# Patient Record
Sex: FEMALE | Race: BLACK OR AFRICAN AMERICAN | Employment: STUDENT | URBAN - METROPOLITAN AREA
[De-identification: names, ages, dates, MRNs, and addresses within clinical notes are randomized per-mention and may not be internally consistent; named-entity substitution may affect disease eponyms.]

---

## 2017-01-19 ENCOUNTER — HOSPITAL ENCOUNTER (EMERGENCY)
Age: 22
Discharge: HOME | End: 2017-01-19
Attending: EMERGENCY MEDICINE
Payer: COMMERCIAL

## 2017-01-19 VITALS
RESPIRATION RATE: 16 BRPM | DIASTOLIC BLOOD PRESSURE: 82 MMHG | WEIGHT: 281 LBS | OXYGEN SATURATION: 99 % | HEIGHT: 67 IN | BODY MASS INDEX: 44.1 KG/M2 | SYSTOLIC BLOOD PRESSURE: 128 MMHG | TEMPERATURE: 97 F | HEART RATE: 99 BPM

## 2017-01-19 DIAGNOSIS — B96.89 GARDNERELLA VAGINITIS: Primary | ICD-10-CM

## 2017-01-19 DIAGNOSIS — N76.0 GARDNERELLA VAGINITIS: Primary | ICD-10-CM

## 2017-01-19 LAB
BILIRUB UR QL STRIP.AUTO: NEGATIVE
CLARITY UR REFRACT.AUTO: CLEAR
COLOR UR AUTO: YELLOW
GLUCOSE UR STRIP.AUTO-MCNC: NEGATIVE MG/DL
KETONES UR STRIP.AUTO-MCNC: NEGATIVE MG/DL
NITRITE UR QL STRIP.AUTO: NEGATIVE
PH UR STRIP.AUTO: 6.5 [PH] (ref 4.5–8)
POCT LOT NUMBER: NORMAL
POCT URINE PREGNANCY: NEGATIVE
PROT UR STRIP.AUTO-MCNC: NEGATIVE MG/DL
RBC UR QL AUTO: NEGATIVE
SP GR UR STRIP.AUTO: 1.02 (ref 1–1.03)
UROBILINOGEN UR STRIP.AUTO-MCNC: 0.2 MG/DL

## 2017-01-19 PROCEDURE — 87491 CHLMYD TRACH DNA AMP PROBE: CPT | Performed by: EMERGENCY MEDICINE

## 2017-01-19 PROCEDURE — 87660 TRICHOMONAS VAGIN DIR PROBE: CPT | Performed by: EMERGENCY MEDICINE

## 2017-01-19 PROCEDURE — 81001 URINALYSIS AUTO W/SCOPE: CPT | Performed by: EMERGENCY MEDICINE

## 2017-01-19 PROCEDURE — 87086 URINE CULTURE/COLONY COUNT: CPT | Performed by: EMERGENCY MEDICINE

## 2017-01-19 PROCEDURE — 87591 N.GONORRHOEAE DNA AMP PROB: CPT | Performed by: EMERGENCY MEDICINE

## 2017-01-19 PROCEDURE — 81025 URINE PREGNANCY TEST: CPT

## 2017-01-19 PROCEDURE — 87510 GARDNER VAG DNA DIR PROBE: CPT | Performed by: EMERGENCY MEDICINE

## 2017-01-19 PROCEDURE — 99284 EMERGENCY DEPT VISIT MOD MDM: CPT

## 2017-01-19 PROCEDURE — 87480 CANDIDA DNA DIR PROBE: CPT | Performed by: EMERGENCY MEDICINE

## 2017-01-19 PROCEDURE — 87077 CULTURE AEROBIC IDENTIFY: CPT | Performed by: EMERGENCY MEDICINE

## 2017-01-19 PROCEDURE — 87186 SC STD MICRODIL/AGAR DIL: CPT | Performed by: EMERGENCY MEDICINE

## 2017-01-19 RX ORDER — METRONIDAZOLE 500 MG/1
500 TABLET ORAL 3 TIMES DAILY
Qty: 30 TABLET | Refills: 0 | Status: SHIPPED | OUTPATIENT
Start: 2017-01-19 | End: 2017-01-29

## 2017-01-19 NOTE — ED PROVIDER NOTES
Patient Seen in: THE [facility] Emergency Department In [place]    History   Patient presents with:  Eval-G (gynecologic)    Stated Complaint: EVAL G    HPI    Patient reports vaginal burning and discharge since Monday.   White in color no bleeding no pelvic p Exam   Constitutional: She is oriented to person, place, and time. She appears well-developed and well-nourished. Non-toxic appearance. No distress. HENT:   Head: Normocephalic and atraumatic.    Eyes: Conjunctivae are normal. Pupils are equal, round, an limits   VAGINITIS/VAGINOSIS, DNA PROBE - Abnormal; Notable for the following:     Vaginitis Panel Result:   (*)     Value: Positive For Gardnerella,Highly Suggestive Of Bact Vaginosis    All other components within normal limits   POCT PREGNANCY, URINE

## 2017-01-19 NOTE — ED INITIAL ASSESSMENT (HPI)
Pt reports vaginal pain, burning, swelling and discharge since Monday. Pt states she has had recent unprotected intercourse.

## 2017-01-20 LAB
C TRACH DNA SPEC QL NAA+PROBE: NEGATIVE
N GONORRHOEA DNA SPEC QL NAA+PROBE: NEGATIVE

## 2017-09-29 ENCOUNTER — HOSPITAL ENCOUNTER (OUTPATIENT)
Dept: GENERAL RADIOLOGY | Facility: HOSPITAL | Age: 22
Discharge: HOME | End: 2017-09-29
Attending: INTERNAL MEDICINE
Payer: COMMERCIAL

## 2017-09-29 ENCOUNTER — OFFICE VISIT (OUTPATIENT)
Dept: RHEUMATOLOGY | Facility: CLINIC | Age: 22
End: 2017-09-29

## 2017-09-29 ENCOUNTER — APPOINTMENT (OUTPATIENT)
Dept: LAB | Facility: HOSPITAL | Age: 22
End: 2017-09-29
Attending: INTERNAL MEDICINE
Payer: COMMERCIAL

## 2017-09-29 VITALS
HEART RATE: 74 BPM | WEIGHT: 293 LBS | DIASTOLIC BLOOD PRESSURE: 80 MMHG | HEIGHT: 68 IN | BODY MASS INDEX: 44.41 KG/M2 | SYSTOLIC BLOOD PRESSURE: 120 MMHG

## 2017-09-29 DIAGNOSIS — M06.031 RHEUMATOID ARTHRITIS INVOLVING RIGHT WRIST WITH NEGATIVE RHEUMATOID FACTOR (HCC): ICD-10-CM

## 2017-09-29 DIAGNOSIS — M06.031 RHEUMATOID ARTHRITIS INVOLVING RIGHT WRIST WITH NEGATIVE RHEUMATOID FACTOR (HCC): Primary | ICD-10-CM

## 2017-09-29 PROCEDURE — 86200 CCP ANTIBODY: CPT

## 2017-09-29 PROCEDURE — 36415 COLL VENOUS BLD VENIPUNCTURE: CPT

## 2017-09-29 PROCEDURE — 99212 OFFICE O/P EST SF 10 MIN: CPT | Performed by: INTERNAL MEDICINE

## 2017-09-29 PROCEDURE — 86140 C-REACTIVE PROTEIN: CPT

## 2017-09-29 PROCEDURE — 86431 RHEUMATOID FACTOR QUANT: CPT

## 2017-09-29 PROCEDURE — 73130 X-RAY EXAM OF HAND: CPT | Performed by: INTERNAL MEDICINE

## 2017-09-29 PROCEDURE — 80053 COMPREHEN METABOLIC PANEL: CPT

## 2017-09-29 PROCEDURE — 99214 OFFICE O/P EST MOD 30 MIN: CPT | Performed by: INTERNAL MEDICINE

## 2017-09-29 PROCEDURE — 85027 COMPLETE CBC AUTOMATED: CPT

## 2017-09-29 PROCEDURE — 85652 RBC SED RATE AUTOMATED: CPT

## 2017-09-29 NOTE — PROGRESS NOTES
Noel Carlos is a 20-year-old with inflammatory polyarthropathy, most consistent with seronegative rheumatoid arthritis, who I last saw December 28th of 2015.   It has involved almost entirely her right wrist, although on occasion there have been flares in her le Current Outpatient Prescriptions:  predniSONE (DELTASONE) 5 MG Oral Tab Take 6 now, 6 Q AM x 1 day, 5 Q AM x 2 days, 4 Q AM x 2 days, 3 Q AM x 2 days, 2 Q AM x 2 days, 1 Q AM x 2 days, and then off.  Disp: 42 tablet Rfl: 0   ibuprofen (MOTRIN) 200 MG Or Acneiform rash on face. Psychiatric: She has a normal mood and affect. ASSESSMENT/PLAN:     1. Inflammatory polyarthropathy, which has effected especially her right wrist consistently. It has been in her left wrist to a lesser degree.   There

## 2017-10-05 ENCOUNTER — TELEPHONE (OUTPATIENT)
Dept: RHEUMATOLOGY | Facility: CLINIC | Age: 22
End: 2017-10-05

## 2017-10-08 ENCOUNTER — TELEPHONE (OUTPATIENT)
Dept: RHEUMATOLOGY | Facility: CLINIC | Age: 22
End: 2017-10-08

## 2017-10-13 ENCOUNTER — TELEPHONE (OUTPATIENT)
Dept: RHEUMATOLOGY | Facility: CLINIC | Age: 22
End: 2017-10-13

## 2017-10-13 NOTE — TELEPHONE ENCOUNTER
FYI- pt added to your schedule on 10/17 at 12:40 pm in [place]. Pt was agreeable and given address to location.

## 2017-10-13 NOTE — TELEPHONE ENCOUNTER
Patient needs an appt for Monday 10/16 or Tuesday 10/17 and would like to know if can add her onto the schedule. Patient needs a F/U appt.

## 2017-10-17 ENCOUNTER — OFFICE VISIT (OUTPATIENT)
Dept: RHEUMATOLOGY | Facility: CLINIC | Age: 22
End: 2017-10-17

## 2017-10-17 VITALS
HEIGHT: 68 IN | SYSTOLIC BLOOD PRESSURE: 123 MMHG | BODY MASS INDEX: 44.41 KG/M2 | HEART RATE: 56 BPM | WEIGHT: 293 LBS | DIASTOLIC BLOOD PRESSURE: 77 MMHG

## 2017-10-17 DIAGNOSIS — M06.031 RHEUMATOID ARTHRITIS INVOLVING RIGHT WRIST WITH NEGATIVE RHEUMATOID FACTOR (HCC): Primary | ICD-10-CM

## 2017-10-17 DIAGNOSIS — Z51.81 ENCOUNTER FOR THERAPEUTIC DRUG MONITORING: ICD-10-CM

## 2017-10-17 PROCEDURE — 99212 OFFICE O/P EST SF 10 MIN: CPT | Performed by: INTERNAL MEDICINE

## 2017-10-17 PROCEDURE — 99213 OFFICE O/P EST LOW 20 MIN: CPT | Performed by: INTERNAL MEDICINE

## 2017-10-17 RX ORDER — PREDNISONE 1 MG/1
TABLET ORAL
Qty: 100 TABLET | Refills: 1 | Status: SHIPPED | OUTPATIENT
Start: 2017-10-17 | End: 2018-08-13 | Stop reason: ALTCHOICE

## 2017-10-17 RX ORDER — FOLIC ACID 1 MG/1
TABLET ORAL
Qty: 90 TABLET | Refills: 3 | Status: SHIPPED | OUTPATIENT
Start: 2017-10-17 | End: 2018-08-13

## 2017-10-17 RX ORDER — METHOTREXATE 2.5 MG/1
TABLET ORAL
Qty: 20 TABLET | Refills: 2 | Status: SHIPPED | OUTPATIENT
Start: 2017-10-17 | End: 2018-08-13

## 2017-10-17 NOTE — PROGRESS NOTES
Julian Coombs is a 21-year-old with inflammatory polyarthropathy, most consistent with seronegative rheumatoid arthritis, who I last saw September 29th of 2017.   It has involved almost entirely her right wrist, but more recently has been in her left wrist and magallanes Status: Single  Spouse Name: N/A    Years of Education: N/A  Number of Children: N/A     Occupational History  None on file     Social History Main Topics   Smoking status: Never Smoker     Smokeless tobacco: Not on file    Alcohol Use: No    Drug Use: Not knows that she cannot become pregnant. Monitoring labs will be done in 1 month, and I will see her in follow-up afterwards. 2. Rash on her face. Probably acne. 3. Intermittent rash, not active in years.   4.  Status post right ankle sprain earlier in 201

## 2018-08-13 ENCOUNTER — APPOINTMENT (OUTPATIENT)
Dept: LAB | Age: 23
End: 2018-08-13
Attending: PHYSICIAN ASSISTANT
Payer: COMMERCIAL

## 2018-08-13 ENCOUNTER — OFFICE VISIT (OUTPATIENT)
Dept: INTERNAL MEDICINE CLINIC | Facility: CLINIC | Age: 23
End: 2018-08-13
Payer: COMMERCIAL

## 2018-08-13 VITALS
HEART RATE: 60 BPM | BODY MASS INDEX: 46.53 KG/M2 | TEMPERATURE: 100 F | DIASTOLIC BLOOD PRESSURE: 72 MMHG | SYSTOLIC BLOOD PRESSURE: 126 MMHG | RESPIRATION RATE: 16 BRPM | HEIGHT: 66.5 IN | WEIGHT: 293 LBS

## 2018-08-13 DIAGNOSIS — E66.01 MORBID OBESITY WITH BMI OF 45.0-49.9, ADULT (HCC): ICD-10-CM

## 2018-08-13 DIAGNOSIS — Z12.4 SCREENING FOR MALIGNANT NEOPLASM OF CERVIX: ICD-10-CM

## 2018-08-13 DIAGNOSIS — Z00.00 ANNUAL PHYSICAL EXAM: ICD-10-CM

## 2018-08-13 DIAGNOSIS — Z00.00 ANNUAL PHYSICAL EXAM: Primary | ICD-10-CM

## 2018-08-13 DIAGNOSIS — Z11.3 SCREEN FOR STD (SEXUALLY TRANSMITTED DISEASE): ICD-10-CM

## 2018-08-13 LAB
ALT SERPL-CCNC: 22 U/L (ref 14–54)
ANION GAP SERPL CALC-SCNC: 6 MMOL/L (ref 0–18)
AST SERPL-CCNC: 15 U/L (ref 15–41)
BUN BLD-MCNC: 16 MG/DL (ref 8–20)
BUN/CREAT SERPL: 19 (ref 10–20)
CALCIUM BLD-MCNC: 9.1 MG/DL (ref 8.3–10.3)
CHLORIDE SERPL-SCNC: 106 MMOL/L (ref 101–111)
CHOLEST SMN-MCNC: 117 MG/DL (ref ?–200)
CO2 SERPL-SCNC: 27 MMOL/L (ref 22–32)
CREAT BLD-MCNC: 0.84 MG/DL (ref 0.55–1.02)
GLUCOSE BLD-MCNC: 84 MG/DL (ref 70–99)
HDLC SERPL-MCNC: 49 MG/DL (ref 40–59)
LDLC SERPL CALC-MCNC: 58 MG/DL (ref ?–100)
NONHDLC SERPL-MCNC: 68 MG/DL (ref ?–130)
OSMOLALITY SERPL CALC.SUM OF ELEC: 288 MOSM/KG (ref 275–295)
POTASSIUM SERPL-SCNC: 4 MMOL/L (ref 3.6–5.1)
SODIUM SERPL-SCNC: 139 MMOL/L (ref 136–144)
TRIGL SERPL-MCNC: 49 MG/DL (ref 30–149)
TSI SER-ACNC: 1.46 MIU/ML (ref 0.35–5.5)
VLDLC SERPL CALC-MCNC: 10 MG/DL (ref 0–30)

## 2018-08-13 PROCEDURE — 87625 HPV TYPES 16 & 18 ONLY: CPT | Performed by: PHYSICIAN ASSISTANT

## 2018-08-13 PROCEDURE — 83036 HEMOGLOBIN GLYCOSYLATED A1C: CPT | Performed by: PHYSICIAN ASSISTANT

## 2018-08-13 PROCEDURE — 87591 N.GONORRHOEAE DNA AMP PROB: CPT | Performed by: PHYSICIAN ASSISTANT

## 2018-08-13 PROCEDURE — 84450 TRANSFERASE (AST) (SGOT): CPT | Performed by: PHYSICIAN ASSISTANT

## 2018-08-13 PROCEDURE — 87624 HPV HI-RISK TYP POOLED RSLT: CPT | Performed by: PHYSICIAN ASSISTANT

## 2018-08-13 PROCEDURE — 99385 PREV VISIT NEW AGE 18-39: CPT | Performed by: PHYSICIAN ASSISTANT

## 2018-08-13 PROCEDURE — 80048 BASIC METABOLIC PNL TOTAL CA: CPT | Performed by: PHYSICIAN ASSISTANT

## 2018-08-13 PROCEDURE — 36415 COLL VENOUS BLD VENIPUNCTURE: CPT | Performed by: PHYSICIAN ASSISTANT

## 2018-08-13 PROCEDURE — 87491 CHLMYD TRACH DNA AMP PROBE: CPT | Performed by: PHYSICIAN ASSISTANT

## 2018-08-13 PROCEDURE — 84460 ALANINE AMINO (ALT) (SGPT): CPT | Performed by: PHYSICIAN ASSISTANT

## 2018-08-13 PROCEDURE — 84443 ASSAY THYROID STIM HORMONE: CPT | Performed by: PHYSICIAN ASSISTANT

## 2018-08-13 PROCEDURE — 80061 LIPID PANEL: CPT | Performed by: PHYSICIAN ASSISTANT

## 2018-08-13 NOTE — PATIENT INSTRUCTIONS
Please get a flu shot in October. Follow up with Dr. Sierra Shrestha when you are home for break. Follow up visit with Fritz Contreras or Dr. Solo Delgado in 6 months for weight.

## 2018-08-13 NOTE — PROGRESS NOTES
Jeremiah Lozada is a 26year old female who presents for a complete physical exam.   HPI:   Pt presents for annual wellness screening. Diet - terrible per pt. Has been working at [employer] over the summer. Exercise - none currently.     Periods regul intolerance    EXAM:   /72 (BP Location: Right arm, Patient Position: Sitting, Cuff Size: large)   Pulse 60   Temp 99.5 °F (37.5 °C) (Oral)   Resp 16   Ht 66.5\"   Wt (!) 308 lb 8 oz   LMP 08/07/2018   BMI 49.05 kg/m²   Body mass index is 49.05 kg/m²

## 2018-08-14 ENCOUNTER — TELEPHONE (OUTPATIENT)
Dept: INTERNAL MEDICINE CLINIC | Facility: CLINIC | Age: 23
End: 2018-08-14

## 2018-08-14 DIAGNOSIS — Z11.3 SCREEN FOR STD (SEXUALLY TRANSMITTED DISEASE): Primary | ICD-10-CM

## 2018-08-14 LAB
C TRACH DNA SPEC QL NAA+PROBE: POSITIVE
EST. AVERAGE GLUCOSE BLD GHB EST-MCNC: 117 MG/DL (ref 68–126)
HBA1C MFR BLD HPLC: 5.7 % (ref ?–5.7)
N GONORRHOEA DNA SPEC QL NAA+PROBE: NEGATIVE

## 2018-08-14 RX ORDER — AZITHROMYCIN 500 MG/1
1000 TABLET, FILM COATED ORAL ONCE
Qty: 2 TABLET | Refills: 0 | Status: SHIPPED | OUTPATIENT
Start: 2018-08-14 | End: 2018-08-14

## 2018-08-14 NOTE — TELEPHONE ENCOUNTER
Please notify patient I received and reviewed her immunization records. Recommend completing the Gardasil vaccine series (can come in now for #2 and four months later for #3). Rest of immunizations are up to date.

## 2018-08-14 NOTE — TELEPHONE ENCOUNTER
Faxed release of information requesting immunization records. Copy of release of information in blue folder.

## 2018-08-16 ENCOUNTER — NURSE ONLY (OUTPATIENT)
Dept: INTERNAL MEDICINE CLINIC | Facility: CLINIC | Age: 23
End: 2018-08-16

## 2018-08-16 ENCOUNTER — LAB ENCOUNTER (OUTPATIENT)
Dept: LAB | Age: 23
End: 2018-08-16
Attending: PHYSICIAN ASSISTANT
Payer: COMMERCIAL

## 2018-08-16 DIAGNOSIS — Z51.81 ENCOUNTER FOR THERAPEUTIC DRUG MONITORING: ICD-10-CM

## 2018-08-16 DIAGNOSIS — Z11.3 SCREEN FOR STD (SEXUALLY TRANSMITTED DISEASE): ICD-10-CM

## 2018-08-16 DIAGNOSIS — M06.031 RHEUMATOID ARTHRITIS INVOLVING RIGHT WRIST WITH NEGATIVE RHEUMATOID FACTOR (HCC): ICD-10-CM

## 2018-08-16 LAB
ALBUMIN SERPL-MCNC: 3.3 G/DL (ref 3.5–4.8)
AST SERPL-CCNC: 9 U/L (ref 15–41)
BASOPHILS # BLD AUTO: 0.04 X10(3) UL (ref 0–0.1)
BASOPHILS NFR BLD AUTO: 0.5 %
CREAT BLD-MCNC: 0.81 MG/DL (ref 0.55–1.02)
CRP SERPL-MCNC: 0.79 MG/DL (ref ?–1)
EOSINOPHIL # BLD AUTO: 0.08 X10(3) UL (ref 0–0.3)
EOSINOPHIL NFR BLD AUTO: 0.9 %
ERYTHROCYTE [DISTWIDTH] IN BLOOD BY AUTOMATED COUNT: 14 % (ref 11.5–16)
HCT VFR BLD AUTO: 39.9 % (ref 34–50)
HGB BLD-MCNC: 12.4 G/DL (ref 12–16)
IMMATURE GRANULOCYTE COUNT: 0.02 X10(3) UL (ref 0–1)
IMMATURE GRANULOCYTE RATIO %: 0.2 %
LYMPHOCYTES # BLD AUTO: 2.85 X10(3) UL (ref 0.9–4)
LYMPHOCYTES NFR BLD AUTO: 33.3 %
MCH RBC QN AUTO: 24.5 PG (ref 27–33.2)
MCHC RBC AUTO-ENTMCNC: 31.1 G/DL (ref 31–37)
MCV RBC AUTO: 78.7 FL (ref 81–100)
MONOCYTES # BLD AUTO: 0.47 X10(3) UL (ref 0.1–1)
MONOCYTES NFR BLD AUTO: 5.5 %
NEUTROPHIL ABS PRELIM: 5.11 X10 (3) UL (ref 1.3–6.7)
NEUTROPHILS # BLD AUTO: 5.11 X10(3) UL (ref 1.3–6.7)
NEUTROPHILS NFR BLD AUTO: 59.6 %
PLATELET # BLD AUTO: 336 10(3)UL (ref 150–450)
RBC # BLD AUTO: 5.07 X10(6)UL (ref 3.8–5.1)
RED CELL DISTRIBUTION WIDTH-SD: 39.7 FL (ref 35.1–46.3)
SED RATE-ML: 33 MM/HR (ref 0–25)
T PALLIDUM AB SER QL IA: NONREACTIVE
WBC # BLD AUTO: 8.6 X10(3) UL (ref 4–13)

## 2018-08-16 PROCEDURE — 90651 9VHPV VACCINE 2/3 DOSE IM: CPT | Performed by: INTERNAL MEDICINE

## 2018-08-16 PROCEDURE — 90471 IMMUNIZATION ADMIN: CPT | Performed by: INTERNAL MEDICINE

## 2018-08-16 PROCEDURE — 87389 HIV-1 AG W/HIV-1&-2 AB AG IA: CPT | Performed by: PHYSICIAN ASSISTANT

## 2018-08-16 PROCEDURE — 86780 TREPONEMA PALLIDUM: CPT | Performed by: PHYSICIAN ASSISTANT

## 2018-08-17 LAB — HPV I/H RISK 1 DNA SPEC QL NAA+PROBE: POSITIVE

## 2018-08-17 RX ORDER — METRONIDAZOLE 500 MG/1
500 TABLET ORAL 2 TIMES DAILY
Qty: 14 TABLET | Refills: 0 | Status: SHIPPED | OUTPATIENT
Start: 2018-08-17 | End: 2018-08-24

## 2018-08-19 LAB
HPV16 DNA CVX QL PROBE+SIG AMP: POSITIVE
HPV18 DNA CVX QL PROBE+SIG AMP: NEGATIVE

## 2018-08-20 ENCOUNTER — TELEPHONE (OUTPATIENT)
Dept: INTERNAL MEDICINE CLINIC | Facility: CLINIC | Age: 23
End: 2018-08-20

## 2018-08-20 DIAGNOSIS — B96.89 BACTERIAL VAGINOSIS: Primary | ICD-10-CM

## 2018-08-20 DIAGNOSIS — N76.0 BACTERIAL VAGINOSIS: Primary | ICD-10-CM

## 2018-08-20 DIAGNOSIS — A74.9 CHLAMYDIA INFECTION: ICD-10-CM

## 2018-08-20 NOTE — TELEPHONE ENCOUNTER
Spoke with Andree Ponce NP for clarification on result note and patient questions. Per Heidi Slade - pt needs to f/u in 1 month for BV and chlamydia tests as well as have a repeat pap done in 6 months. Verbal orders received from Andree Ponce NP and placed.

## 2018-08-20 NOTE — TELEPHONE ENCOUNTER
Spoke with pt and notified her of test results and provider instructions. Pt has several questions regarding the HPV being positive. Advised pt I would speak with a provider and then call her back.  Pt wishes for a message to be left if she does not answ

## 2018-12-27 ENCOUNTER — OFFICE VISIT (OUTPATIENT)
Dept: INTERNAL MEDICINE CLINIC | Facility: CLINIC | Age: 23
End: 2018-12-27
Payer: COMMERCIAL

## 2018-12-27 VITALS
DIASTOLIC BLOOD PRESSURE: 88 MMHG | SYSTOLIC BLOOD PRESSURE: 133 MMHG | WEIGHT: 293 LBS | TEMPERATURE: 98 F | HEART RATE: 72 BPM | BODY MASS INDEX: 46.53 KG/M2 | HEIGHT: 66.5 IN | RESPIRATION RATE: 16 BRPM

## 2018-12-27 DIAGNOSIS — E66.01 MORBID OBESITY WITH BMI OF 50.0-59.9, ADULT (HCC): ICD-10-CM

## 2018-12-27 DIAGNOSIS — R10.11 RUQ ABDOMINAL PAIN: ICD-10-CM

## 2018-12-27 DIAGNOSIS — N89.8 VAGINAL DISCHARGE: Primary | ICD-10-CM

## 2018-12-27 DIAGNOSIS — R10.13 EPIGASTRIC ABDOMINAL PAIN: ICD-10-CM

## 2018-12-27 PROCEDURE — 90651 9VHPV VACCINE 2/3 DOSE IM: CPT | Performed by: PHYSICIAN ASSISTANT

## 2018-12-27 PROCEDURE — 87510 GARDNER VAG DNA DIR PROBE: CPT | Performed by: PHYSICIAN ASSISTANT

## 2018-12-27 PROCEDURE — 87480 CANDIDA DNA DIR PROBE: CPT | Performed by: PHYSICIAN ASSISTANT

## 2018-12-27 PROCEDURE — 90471 IMMUNIZATION ADMIN: CPT | Performed by: PHYSICIAN ASSISTANT

## 2018-12-27 PROCEDURE — 87491 CHLMYD TRACH DNA AMP PROBE: CPT | Performed by: PHYSICIAN ASSISTANT

## 2018-12-27 PROCEDURE — 87660 TRICHOMONAS VAGIN DIR PROBE: CPT | Performed by: PHYSICIAN ASSISTANT

## 2018-12-27 PROCEDURE — 87591 N.GONORRHOEAE DNA AMP PROB: CPT | Performed by: PHYSICIAN ASSISTANT

## 2018-12-27 PROCEDURE — 99214 OFFICE O/P EST MOD 30 MIN: CPT | Performed by: PHYSICIAN ASSISTANT

## 2018-12-27 RX ORDER — METRONIDAZOLE 500 MG/1
500 TABLET ORAL 2 TIMES DAILY
Qty: 14 TABLET | Refills: 0 | Status: SHIPPED | OUTPATIENT
Start: 2018-12-27 | End: 2019-01-21

## 2018-12-27 NOTE — PATIENT INSTRUCTIONS
Vaginal discharge:  - start metronidazole -- 1 tablet twice a day x 7 days    Abdominal pain:  - avoid fried/fatty/greasy foods  - may use Zantac 150 mg twice a day for heartburn  - weight loss!     Weight:  - start exercising at least 20 minutes every day best to spread your eating throughout the day. · Eat a variety of foods, not just a few favorites. · If you find yourself eating when you’re not hungry, ask yourself why. Many of us eat when we’re bored, stressed, or just to be polite.  Listen to your bod

## 2018-12-27 NOTE — PROGRESS NOTES
Tao Berrios is a 26year old female. HPI:   Patient presents for vaginal symptoms x 2 weeks. C/o fishy odor, cloudy / white to grey vaginal discharge. Denies vaginal itching, dysuria, hematuria.   Has had a couple episodes of bleeding during inte GENERAL: well developed, well nourished, in no acute distress  SKIN: no rashes, no suspicious lesions  HEENT: normocephalic, atraumatic, conjunctiva clear, OP clear, MMM  NECK: supple, no thyromegaly, no lymphadenopathy  LUNGS: regular breathing rate and

## 2019-01-04 ENCOUNTER — PATIENT MESSAGE (OUTPATIENT)
Dept: INTERNAL MEDICINE CLINIC | Facility: CLINIC | Age: 24
End: 2019-01-04

## 2019-01-04 NOTE — TELEPHONE ENCOUNTER
From: Mundo Leiva  To: EFREM Cervantes  Sent: 1/4/2019 2:44 AM CST  Subject: Test Results Question    Hey, I was just wondering if my STD results came back?

## 2019-07-24 ENCOUNTER — LAB ENCOUNTER (OUTPATIENT)
Dept: LAB | Age: 24
End: 2019-07-24
Attending: PHYSICIAN ASSISTANT
Payer: COMMERCIAL

## 2019-07-24 ENCOUNTER — OFFICE VISIT (OUTPATIENT)
Dept: INTERNAL MEDICINE CLINIC | Facility: CLINIC | Age: 24
End: 2019-07-24
Payer: COMMERCIAL

## 2019-07-24 VITALS
HEART RATE: 66 BPM | RESPIRATION RATE: 16 BRPM | OXYGEN SATURATION: 98 % | SYSTOLIC BLOOD PRESSURE: 122 MMHG | WEIGHT: 293 LBS | BODY MASS INDEX: 45.99 KG/M2 | HEIGHT: 67 IN | DIASTOLIC BLOOD PRESSURE: 86 MMHG | TEMPERATURE: 98 F

## 2019-07-24 DIAGNOSIS — Z00.00 ANNUAL PHYSICAL EXAM: Primary | ICD-10-CM

## 2019-07-24 DIAGNOSIS — Z11.3 SCREEN FOR STD (SEXUALLY TRANSMITTED DISEASE): ICD-10-CM

## 2019-07-24 DIAGNOSIS — N89.8 VAGINAL DISCHARGE: ICD-10-CM

## 2019-07-24 DIAGNOSIS — Z00.00 ANNUAL PHYSICAL EXAM: ICD-10-CM

## 2019-07-24 DIAGNOSIS — E66.01 MORBID OBESITY WITH BMI OF 45.0-49.9, ADULT (HCC): ICD-10-CM

## 2019-07-24 DIAGNOSIS — R73.03 PREDIABETES: ICD-10-CM

## 2019-07-24 DIAGNOSIS — Z12.4 SCREENING FOR CERVICAL CANCER: ICD-10-CM

## 2019-07-24 DIAGNOSIS — Z30.016 ENCOUNTER FOR INITIAL PRESCRIPTION OF TRANSDERMAL PATCH HORMONAL CONTRACEPTIVE DEVICE: ICD-10-CM

## 2019-07-24 LAB
ALBUMIN SERPL-MCNC: 3.2 G/DL (ref 3.4–5)
ALBUMIN/GLOB SERPL: 0.6 {RATIO} (ref 1–2)
ALP LIVER SERPL-CCNC: 107 U/L (ref 52–144)
ALT SERPL-CCNC: 21 U/L (ref 13–56)
ANION GAP SERPL CALC-SCNC: 8 MMOL/L (ref 0–18)
AST SERPL-CCNC: 14 U/L (ref 15–37)
BASOPHILS # BLD AUTO: 0.05 X10(3) UL (ref 0–0.2)
BASOPHILS NFR BLD AUTO: 0.5 %
BILIRUB SERPL-MCNC: 0.2 MG/DL (ref 0.1–2)
BUN BLD-MCNC: 13 MG/DL (ref 7–18)
BUN/CREAT SERPL: 16.3 (ref 10–20)
CALCIUM BLD-MCNC: 8.5 MG/DL (ref 8.5–10.1)
CHLORIDE SERPL-SCNC: 108 MMOL/L (ref 98–112)
CHOLEST SMN-MCNC: 129 MG/DL (ref ?–200)
CO2 SERPL-SCNC: 26 MMOL/L (ref 21–32)
CREAT BLD-MCNC: 0.8 MG/DL (ref 0.55–1.02)
DEPRECATED RDW RBC AUTO: 40.3 FL (ref 35.1–46.3)
EOSINOPHIL # BLD AUTO: 0.11 X10(3) UL (ref 0–0.7)
EOSINOPHIL NFR BLD AUTO: 1.2 %
ERYTHROCYTE [DISTWIDTH] IN BLOOD BY AUTOMATED COUNT: 14 % (ref 11–15)
GLOBULIN PLAS-MCNC: 5 G/DL (ref 2.8–4.4)
GLUCOSE BLD-MCNC: 78 MG/DL (ref 70–99)
HCT VFR BLD AUTO: 39.8 % (ref 35–48)
HDLC SERPL-MCNC: 45 MG/DL (ref 40–59)
HGB BLD-MCNC: 12.7 G/DL (ref 12–16)
IMM GRANULOCYTES # BLD AUTO: 0.02 X10(3) UL (ref 0–1)
IMM GRANULOCYTES NFR BLD: 0.2 %
LDLC SERPL CALC-MCNC: 74 MG/DL (ref ?–100)
LYMPHOCYTES # BLD AUTO: 2.49 X10(3) UL (ref 1–4)
LYMPHOCYTES NFR BLD AUTO: 26.3 %
M PROTEIN MFR SERPL ELPH: 8.2 G/DL (ref 6.4–8.2)
MCH RBC QN AUTO: 25.3 PG (ref 26–34)
MCHC RBC AUTO-ENTMCNC: 31.9 G/DL (ref 31–37)
MCV RBC AUTO: 79.4 FL (ref 80–100)
MONOCYTES # BLD AUTO: 0.43 X10(3) UL (ref 0.1–1)
MONOCYTES NFR BLD AUTO: 4.5 %
NEUTROPHILS # BLD AUTO: 6.38 X10 (3) UL (ref 1.5–7.7)
NEUTROPHILS # BLD AUTO: 6.38 X10(3) UL (ref 1.5–7.7)
NEUTROPHILS NFR BLD AUTO: 67.3 %
NONHDLC SERPL-MCNC: 84 MG/DL (ref ?–130)
OSMOLALITY SERPL CALC.SUM OF ELEC: 293 MOSM/KG (ref 275–295)
PLATELET # BLD AUTO: 334 10(3)UL (ref 150–450)
POTASSIUM SERPL-SCNC: 3.8 MMOL/L (ref 3.5–5.1)
RBC # BLD AUTO: 5.01 X10(6)UL (ref 3.8–5.3)
SODIUM SERPL-SCNC: 142 MMOL/L (ref 136–145)
TRIGL SERPL-MCNC: 52 MG/DL (ref 30–149)
TSI SER-ACNC: 1.46 MIU/ML (ref 0.36–3.74)
VLDLC SERPL CALC-MCNC: 10 MG/DL (ref 0–30)
WBC # BLD AUTO: 9.5 X10(3) UL (ref 4–11)

## 2019-07-24 PROCEDURE — 80050 GENERAL HEALTH PANEL: CPT | Performed by: PHYSICIAN ASSISTANT

## 2019-07-24 PROCEDURE — 99214 OFFICE O/P EST MOD 30 MIN: CPT | Performed by: PHYSICIAN ASSISTANT

## 2019-07-24 PROCEDURE — 88175 CYTOPATH C/V AUTO FLUID REDO: CPT | Performed by: PHYSICIAN ASSISTANT

## 2019-07-24 PROCEDURE — 87480 CANDIDA DNA DIR PROBE: CPT | Performed by: PHYSICIAN ASSISTANT

## 2019-07-24 PROCEDURE — 90651 9VHPV VACCINE 2/3 DOSE IM: CPT | Performed by: PHYSICIAN ASSISTANT

## 2019-07-24 PROCEDURE — 86780 TREPONEMA PALLIDUM: CPT | Performed by: PHYSICIAN ASSISTANT

## 2019-07-24 PROCEDURE — 87491 CHLMYD TRACH DNA AMP PROBE: CPT | Performed by: PHYSICIAN ASSISTANT

## 2019-07-24 PROCEDURE — 99395 PREV VISIT EST AGE 18-39: CPT | Performed by: PHYSICIAN ASSISTANT

## 2019-07-24 PROCEDURE — 90471 IMMUNIZATION ADMIN: CPT | Performed by: PHYSICIAN ASSISTANT

## 2019-07-24 PROCEDURE — 36415 COLL VENOUS BLD VENIPUNCTURE: CPT | Performed by: PHYSICIAN ASSISTANT

## 2019-07-24 PROCEDURE — 87660 TRICHOMONAS VAGIN DIR PROBE: CPT | Performed by: PHYSICIAN ASSISTANT

## 2019-07-24 PROCEDURE — 80061 LIPID PANEL: CPT | Performed by: PHYSICIAN ASSISTANT

## 2019-07-24 PROCEDURE — 87591 N.GONORRHOEAE DNA AMP PROB: CPT | Performed by: PHYSICIAN ASSISTANT

## 2019-07-24 PROCEDURE — 87389 HIV-1 AG W/HIV-1&-2 AB AG IA: CPT | Performed by: PHYSICIAN ASSISTANT

## 2019-07-24 PROCEDURE — 87510 GARDNER VAG DNA DIR PROBE: CPT | Performed by: PHYSICIAN ASSISTANT

## 2019-07-24 RX ORDER — PREDNISONE 20 MG/1
TABLET ORAL
Refills: 0 | COMMUNITY
Start: 2019-05-08 | End: 2020-01-07 | Stop reason: ALTCHOICE

## 2019-07-24 NOTE — PATIENT INSTRUCTIONS
Blood work today. Birth control:  - start Ortho Evra patch on 1st day of next period OR the Sunday that follows  - apply patch x 1 week then remove and replace with another. Do this for 3 weeks then NO patch x 1 week.   - rotate the site where you apply

## 2019-07-24 NOTE — PROGRESS NOTES
Vidya Kerns is a 26year old female who presents for a complete physical exam.   HPI:   Pt presents for annual wellness screening. Periods mostly regular, within 5-7 days each month.   Pt has been sexually active with several male partners in the past denies headaches, numbness, tingling, weakness  BREAST: no pain, discharge, or lumps  HEMATOLOGIC: denies easy bruising or bleeding  LYMPH: denies swollen lymph nodes  ENDOCRINE: denies hot/cold intolerance    EXAM:   /86   Pulse 66   Temp 98.2 °F (3 focus on lifestyle changes. # RA: previously on MTX. Uses prednisone prn for flare ups. Re-establishing with rheum - has appt today. # PCOS: declines metformin.     Health Maint (CPX 7/24/19): # Breast CA screening: discuss MMG recs at age 37.   # Cerv

## 2019-07-25 LAB
C TRACH DNA SPEC QL NAA+PROBE: NEGATIVE
N GONORRHOEA DNA SPEC QL NAA+PROBE: NEGATIVE
T PALLIDUM AB SER QL IA: NONREACTIVE

## 2019-07-26 RX ORDER — METRONIDAZOLE 500 MG/1
500 TABLET ORAL 2 TIMES DAILY
Qty: 14 TABLET | Refills: 0 | Status: SHIPPED | OUTPATIENT
Start: 2019-07-26 | End: 2019-08-02

## 2020-01-07 ENCOUNTER — LAB ENCOUNTER (OUTPATIENT)
Dept: LAB | Age: 25
End: 2020-01-07
Attending: PHYSICIAN ASSISTANT
Payer: COMMERCIAL

## 2020-01-07 ENCOUNTER — OFFICE VISIT (OUTPATIENT)
Dept: INTERNAL MEDICINE CLINIC | Facility: CLINIC | Age: 25
End: 2020-01-07
Payer: COMMERCIAL

## 2020-01-07 VITALS
BODY MASS INDEX: 45.99 KG/M2 | HEIGHT: 67 IN | WEIGHT: 293 LBS | OXYGEN SATURATION: 98 % | TEMPERATURE: 99 F | RESPIRATION RATE: 16 BRPM | SYSTOLIC BLOOD PRESSURE: 124 MMHG | HEART RATE: 70 BPM | DIASTOLIC BLOOD PRESSURE: 72 MMHG

## 2020-01-07 DIAGNOSIS — R73.03 PREDIABETES: ICD-10-CM

## 2020-01-07 DIAGNOSIS — N63.22 BREAST LUMP ON LEFT SIDE AT 10 O'CLOCK POSITION: Primary | ICD-10-CM

## 2020-01-07 DIAGNOSIS — Z30.011 ENCOUNTER FOR INITIAL PRESCRIPTION OF CONTRACEPTIVE PILLS: ICD-10-CM

## 2020-01-07 DIAGNOSIS — L98.9 SKIN LESION: ICD-10-CM

## 2020-01-07 DIAGNOSIS — Z11.3 SCREEN FOR STD (SEXUALLY TRANSMITTED DISEASE): ICD-10-CM

## 2020-01-07 DIAGNOSIS — E66.01 MORBID OBESITY WITH BMI OF 45.0-49.9, ADULT (HCC): ICD-10-CM

## 2020-01-07 DIAGNOSIS — Z00.00 ANNUAL PHYSICAL EXAM: ICD-10-CM

## 2020-01-07 DIAGNOSIS — L30.9 DERMATITIS: ICD-10-CM

## 2020-01-07 LAB
EST. AVERAGE GLUCOSE BLD GHB EST-MCNC: 117 MG/DL (ref 68–126)
HBA1C MFR BLD HPLC: 5.7 % (ref ?–5.7)
T PALLIDUM AB SER QL IA: NONREACTIVE

## 2020-01-07 PROCEDURE — 36415 COLL VENOUS BLD VENIPUNCTURE: CPT | Performed by: PHYSICIAN ASSISTANT

## 2020-01-07 PROCEDURE — 86780 TREPONEMA PALLIDUM: CPT | Performed by: PHYSICIAN ASSISTANT

## 2020-01-07 PROCEDURE — 87389 HIV-1 AG W/HIV-1&-2 AB AG IA: CPT | Performed by: PHYSICIAN ASSISTANT

## 2020-01-07 PROCEDURE — 87591 N.GONORRHOEAE DNA AMP PROB: CPT | Performed by: PHYSICIAN ASSISTANT

## 2020-01-07 PROCEDURE — 87491 CHLMYD TRACH DNA AMP PROBE: CPT | Performed by: PHYSICIAN ASSISTANT

## 2020-01-07 PROCEDURE — 99214 OFFICE O/P EST MOD 30 MIN: CPT | Performed by: PHYSICIAN ASSISTANT

## 2020-01-07 PROCEDURE — 83036 HEMOGLOBIN GLYCOSYLATED A1C: CPT | Performed by: PHYSICIAN ASSISTANT

## 2020-01-07 RX ORDER — NORGESTIMATE AND ETHINYL ESTRADIOL 0.25-0.035
1 KIT ORAL DAILY
Qty: 3 PACKAGE | Refills: 3 | Status: SHIPPED | OUTPATIENT
Start: 2020-01-07

## 2020-01-07 NOTE — PATIENT INSTRUCTIONS
Blood work & urine test today. Please call Kurt Anguiano at [phone] to set up the following tests:  - breast ultrasound    Start birth control pill on the first day of your next period OR the Sunday that follows.     Tattoo itch

## 2020-01-07 NOTE — PROGRESS NOTES
Viktoriya Chase is a 22year old female. HPI:   Patient presents for multiple issues. Stopped birth control patch three months ago d/t insomnia. Now sleeping much better. Periods remain regular, last 4-5 days but sometimes heavy. LMP 12/23/19.   Sex Other (Other) Father         gout   • Arthritis Maternal Uncle         RA        REVIEW OF SYSTEMS:   GENERAL HEALTH: denies fever, chills, night sweats  SKIN: denies any other unusual skin lesions or rashes  RESPIRATORY: denies SOB, TERRY  CARDIOVASCULAR: d appearing. Ok to monitor or can see derm for excision if any changes. Issues not discussed at today's visit:  # Hx of ASCUS: on pap 8/2018 with HR HPV+. Pap repeated today.   If cytology normal will need pap in another 12 months and if normal can resum

## 2020-01-08 LAB
C TRACH DNA SPEC QL NAA+PROBE: NEGATIVE
N GONORRHOEA DNA SPEC QL NAA+PROBE: NEGATIVE

## 2024-07-19 ENCOUNTER — OFFICE VISIT (OUTPATIENT)
Dept: FAMILY MEDICINE CLINIC | Facility: CLINIC | Age: 29
End: 2024-07-19
Payer: COMMERCIAL

## 2024-07-19 VITALS
SYSTOLIC BLOOD PRESSURE: 130 MMHG | HEART RATE: 88 BPM | OXYGEN SATURATION: 98 % | BODY MASS INDEX: 45.99 KG/M2 | RESPIRATION RATE: 16 BRPM | HEIGHT: 67 IN | TEMPERATURE: 98 F | WEIGHT: 293 LBS | DIASTOLIC BLOOD PRESSURE: 82 MMHG

## 2024-07-19 DIAGNOSIS — N62 LARGE BREASTS: ICD-10-CM

## 2024-07-19 DIAGNOSIS — N92.6 LATE PERIOD: ICD-10-CM

## 2024-07-19 DIAGNOSIS — M06.031 RHEUMATOID ARTHRITIS INVOLVING RIGHT WRIST WITH NEGATIVE RHEUMATOID FACTOR (HCC): ICD-10-CM

## 2024-07-19 DIAGNOSIS — E28.2 PCOS (POLYCYSTIC OVARIAN SYNDROME): ICD-10-CM

## 2024-07-19 DIAGNOSIS — Z00.00 WELL ADULT EXAM: Primary | ICD-10-CM

## 2024-07-19 DIAGNOSIS — Z30.42 DEPO-PROVERA CONTRACEPTIVE STATUS: ICD-10-CM

## 2024-07-19 DIAGNOSIS — E66.01 MORBID OBESITY (HCC): ICD-10-CM

## 2024-07-19 DIAGNOSIS — Z30.09 ENCOUNTER FOR COUNSELING REGARDING INITIATION OF OTHER CONTRACEPTIVE MEASURE: ICD-10-CM

## 2024-07-19 PROBLEM — R73.03 PREDIABETES: Status: ACTIVE | Noted: 2024-02-28

## 2024-07-19 LAB
CONTROL LINE PRESENT WITH A CLEAR BACKGROUND (YES/NO): YES YES/NO
KIT EXPIRATION DATE: NORMAL DATE
KIT LOT #: NORMAL NUMERIC
PREGNANCY TEST, URINE: NEGATIVE

## 2024-07-19 PROCEDURE — 81025 URINE PREGNANCY TEST: CPT | Performed by: FAMILY MEDICINE

## 2024-07-19 PROCEDURE — 99385 PREV VISIT NEW AGE 18-39: CPT | Performed by: FAMILY MEDICINE

## 2024-07-19 PROCEDURE — 3008F BODY MASS INDEX DOCD: CPT | Performed by: FAMILY MEDICINE

## 2024-07-19 PROCEDURE — 99203 OFFICE O/P NEW LOW 30 MIN: CPT | Performed by: FAMILY MEDICINE

## 2024-07-19 PROCEDURE — 3075F SYST BP GE 130 - 139MM HG: CPT | Performed by: FAMILY MEDICINE

## 2024-07-19 PROCEDURE — 3079F DIAST BP 80-89 MM HG: CPT | Performed by: FAMILY MEDICINE

## 2024-07-19 RX ORDER — MEDROXYPROGESTERONE ACETATE 150 MG/ML
150 INJECTION, SUSPENSION INTRAMUSCULAR
Qty: 1 ML | Refills: 0 | Status: SHIPPED | OUTPATIENT
Start: 2024-07-19 | End: 2025-04-15

## 2024-07-19 NOTE — PROGRESS NOTES
Note to patient: The 21st Century Cures Act makes medical notes like these available to patients in the interest of transparency. However, be advised this is a medical document. It is intended as peer to peer communication. It is written in medical language and may contain abbreviations or verbiage that are unfamiliar. It may appear blunt or direct. Medical documents are intended to carry relevant information, facts as evident, and the clinical opinion of the practitioner.         Chief Complaint   Patient presents with    Medication Request     Would like to restart depo shot     Physical       HPI:    Pt is here for her annual physical.     Patient's last menstrual period was 06/15/2024 (approximate).  Patient has to change providers. Her last Depo injection was on 2/28/24. She needed a new doctor and was not able to get the second shot in May.   she has a longstadning hx of irregular periods. She has PCOS. She had her daughter last May.    She denies spotting. Denies hx of HTN, DVT, migraines with aura,  blood clotting disorder in the family.   She is aware the depo shot can cause weight gain.     Hx of high blood pressure after her pregnancy she was taking antihypertensives for several months then it was discontinued.    Last A1c value was 5.7% done 1/7/2020.    Hx of RA- diagnosed in 2014. She is not on any medications. She was having some mild joint pains. She reports her joint pains occur seasonally. She no longer has a current Rheumatologist. Due for follow up. Lost to f/u with Rheum.     She would like to pursue breast reduction. She has chronic upper back pain, trouble working out due to this.     PMHx: PCOS    Smoking: none  Alcohol: occasionally   Drugs: none   Sexual hx: 1 partner   STD hx: declined  No fmhx of colon or breast Ca.   Pap smear: utd   Tdap:2023  Diet: recently cut out sugar.   Exercise: very active at work- she is a  at [employer]. She does not do dedicated workouts.        Review of Systems   Constitutional: Negative for fever, chills and fatigue. No distress.  HENT: Negative for hearing loss, congestion, sore throat, neck pain   Eyes: Negative for pain and visual disturbance.   Respiratory: Negative for cough, chest tightness, shortness of breath and wheezing.    Cardiovascular: Negative for chest pain, palpitations and leg swelling.   Gastrointestinal: Negative for nausea, vomiting, abdominal pain, diarrhea, blood in stool and abdominal distention.   Genitourinary: Negative for dysuria, hematuria and difficulty urinating.     Patient Active Problem List   Diagnosis    Encounter for therapeutic drug monitoring    Rheumatoid arthritis involving right wrist with negative rheumatoid factor (HCC)    Acne vulgaris    Morbid obesity with BMI of 45.0-49.9, adult (HCC)    Prediabetes    Depo-Provera contraceptive status    PCOS (polycystic ovarian syndrome)    Large breasts       Past Medical History:    PCOS (polycystic ovarian syndrome)    Rheumatoid arthritis (HCC)     History reviewed. No pertinent surgical history.  Family History   Problem Relation Age of Onset    Heart Attack Paternal Grandmother         MI    Cancer Paternal Grandmother         breast cancer    Arthritis Paternal Grandfather     Other (Other) Father         gout    Arthritis Maternal Uncle         RA     Social History     Socioeconomic History    Marital status: Single   Tobacco Use    Smoking status: Never     Passive exposure: Never    Smokeless tobacco: Never   Vaping Use    Vaping status: Never Used   Substance and Sexual Activity    Alcohol use: Yes     Comment: social    Drug use: Yes     Frequency: 3.0 times per week     Types: Cannabis    Sexual activity: Yes   Other Topics Concern    Caffeine Concern No    Stress Concern No    Weight Concern No    Special Diet No    Exercise No    Seat Belt Yes       Current Outpatient Medications   Medication Sig Dispense Refill    medroxyPROGESTERone 150 MG/ML  Intramuscular Suspension Inject 1 mL (150 mg total) into the muscle every 3 (three) months. 1 mL 0    ibuprofen (MOTRIN) 200 MG Oral Tab Take 1 tablet (200 mg total) by mouth every 6 (six) hours as needed.         Allergies  Allergies   Allergen Reactions    Gramineae Pollens SHORTNESS OF BREATH    Pollen Extract SHORTNESS OF BREATH    Wheat HIVES, ITCHING and UNKNOWN     Allergy no reaction on file    Wheat Extract SHORTNESS OF BREATH    Naphazoline-Glycerin-Zinc Sulf OTHER (SEE COMMENTS)     Grass    Seasonal      Grass      Wheat Bran      Allergy no reaction on file        Health Maintenance  Immunizations:  Immunization History   Administered Date(s) Administered    Covid-19 Vaccine Pfizer Mirza-Sucrose 30 mcg/0.3 ml 03/26/2022    DTAP 09/19/1996, 12/02/1996, 04/11/1997, 11/25/1997, 08/20/2000    FLUZONE 6 months and older PFS 0.5 ml (01731) 12/14/2022    HEP A,Ped/Adol,(2 Dose) 07/18/2006, 08/17/2010    HEP B 07/31/1996, 09/19/1996, 04/11/1997    HIB 09/19/1996, 12/02/1996, 04/11/1997, 11/25/1997    HPV (Gardasil) 07/16/2014    Hpv Virus Vaccine 9 Torie Im 08/16/2018, 12/27/2018, 07/24/2019    IPV 09/19/1996, 12/02/1996, 04/11/1997, 08/20/2000    MMR 10/18/1997, 08/20/2000    Meningococcal-Menactra 08/17/2010, 07/16/2014    Pneumococcal (Prevnar 7) 07/10/2001    TDAP 08/17/2010, 02/24/2023    Tb Intradermal Test 08/30/2011    Varicella 10/18/1997, 08/17/2010         Physical Exam  /82   Pulse 88   Temp 98 °F (36.7 °C) (Temporal)   Resp 16   Ht 5' 7\" (1.702 m)   Wt (!) 331 lb (150.1 kg)   LMP 06/15/2024 (Approximate)   SpO2 98%   BMI 51.84 kg/m²   Constitutional: Oriented to person, place, and time. No distress.   HEENT:  Normocephalic and atraumatic. Hearing and tympanic membranes normal.  Nose normal. Oropharynx is clear and moist.   Eyes: Conjunctivae and EOM are normal. PERRLA. No scleral icterus.   Neck:  No mass and no thyromegaly present.   Large breasts.   Cardiovascular: Normal rate, regular  rhythm and intact distal pulses.  No murmur, rubs or gallops.   Pulmonary/Chest: Effort normal and breath sounds normal. No respiratory distress. No wheezes, rhonchi or rales  Abdominal: Soft. Bowel sounds are normal. Non tender, no masses, no organomegaly or hernias.  Neurological: grossly normal   Skin: Skin is warm and dry.  Psychiatric: Normal mood and affect.     A/P:    Encounter Diagnoses   Name Primary?    Late period     Well adult exam Yes    Morbid obesity (HCC)     Depo-Provera contraceptive status     Encounter for counseling regarding initiation of other contraceptive measure     PCOS (polycystic ovarian syndrome)     Rheumatoid arthritis involving right wrist with negative rheumatoid factor (HCC)     Large breasts        1. Late period  Pregnancy test is negative. Depo ordered. Hx of irregular menses due to PCOS.   - Urine Preg Test    2. Well adult exam  - -Discussed diet and exercise, counseled on vaccine and screening guidelines.     - CBC With Differential With Platelet; Future  - Comp Metabolic Panel (14); Future  - Lipid Panel; Future    3. Morbid obesity (HCC)  Workup as below. Referred to [facility]   - Hemoglobin A1C; Future  - TSH W Reflex To Free T4; Future  - [facility] [place] [facility]  - Vitamin D [E]; Future    4. Depo-Provera contraceptive status  - medroxyPROGESTERone 150 MG/ML Intramuscular Suspension; Inject 1 mL (150 mg total) into the muscle every 3 (three) months.  Dispense: 1 mL; Refill: 0    5. Encounter for counseling regarding initiation of other contraceptive measure  Advised that this can contribute to her weight gain.    Warned on risks of HTN, weight change, mood change, blood clots. Q3 month dosing.      - medroxyPROGESTERone 150 MG/ML Intramuscular Suspension; Inject 1 mL (150 mg total) into the muscle every 3 (three) months.  Dispense: 1 mL; Refill: 0    6. PCOS (polycystic ovarian syndrome)  Pt prefers to be on Depo.        7. Rheumatoid arthritis  involving right wrist with negative rheumatoid factor (HCC)  Not on medication currently. Referred to Rheum   - Rheumatology Referral - In Network    8. Large breasts  Referred for discussion on breast reduction.   Symptoms: chronic upper back pain.   - Plastic Surgery Referral - In Network      Orders Placed This Encounter   Procedures    Urine Preg Test    CBC With Differential With Platelet    Comp Metabolic Panel (14)    Lipid Panel    Hemoglobin A1C    TSH W Reflex To Free T4    Vitamin D [E]       Meds & Refills for this Visit:  Requested Prescriptions     Signed Prescriptions Disp Refills    medroxyPROGESTERone 150 MG/ML Intramuscular Suspension 1 mL 0     Sig: Inject 1 mL (150 mg total) into the muscle every 3 (three) months.       Imaging & Consults:  RHEUMATOLOGY - INTERNAL  PLASTIC SURGERY - INTERNAL  OP REFERRAL TO WEIGHT LOSS CLINIC      No follow-ups on file.    There are no Patient Instructions on file for this visit.    All questions were answered and the patient understands the plan.     Alexa Tucker,         This note was prepared using Dragon Medical voice recognition dictation software. As a result errors may occur. When identified these errors have been corrected. While every attempt is made to correct errors during dictation discrepancies may still exist.      Note to patient: The 21st Century Cures Act makes medical notes like these available to patients in the interest of transparency. However, be advised this is a medical document. It is intended as peer to peer communication. It is written in medical language and may contain abbreviations or verbiage that are unfamiliar. It may appear blunt or direct. Medical documents are intended to carry relevant information, facts as evident, and the clinical opinion of the practitioner.

## 2024-10-06 ENCOUNTER — HOSPITAL ENCOUNTER (EMERGENCY)
Age: 29
Discharge: HOME | End: 2024-10-06
Attending: EMERGENCY MEDICINE
Payer: COMMERCIAL

## 2024-10-06 ENCOUNTER — APPOINTMENT (OUTPATIENT)
Dept: GENERAL RADIOLOGY | Age: 29
End: 2024-10-06
Attending: NURSE PRACTITIONER
Payer: COMMERCIAL

## 2024-10-06 VITALS
WEIGHT: 293 LBS | HEIGHT: 67 IN | DIASTOLIC BLOOD PRESSURE: 79 MMHG | TEMPERATURE: 98 F | SYSTOLIC BLOOD PRESSURE: 114 MMHG | RESPIRATION RATE: 17 BRPM | OXYGEN SATURATION: 100 % | HEART RATE: 59 BPM | BODY MASS INDEX: 45.99 KG/M2

## 2024-10-06 DIAGNOSIS — R74.8 ELEVATED LIVER ENZYMES: Primary | ICD-10-CM

## 2024-10-06 DIAGNOSIS — R12 HEART BURN: ICD-10-CM

## 2024-10-06 LAB
ALBUMIN SERPL-MCNC: 3 G/DL (ref 3.4–5)
ALBUMIN/GLOB SERPL: 0.7 {RATIO} (ref 1–2)
ALP LIVER SERPL-CCNC: 124 U/L
ALT SERPL-CCNC: 104 U/L
ANION GAP SERPL CALC-SCNC: 2 MMOL/L (ref 0–18)
AST SERPL-CCNC: 18 U/L (ref 15–37)
BASOPHILS # BLD AUTO: 0.04 X10(3) UL (ref 0–0.2)
BASOPHILS NFR BLD AUTO: 0.6 %
BILIRUB SERPL-MCNC: 0.2 MG/DL (ref 0.1–2)
BUN BLD-MCNC: 18 MG/DL (ref 9–23)
CALCIUM BLD-MCNC: 8.8 MG/DL (ref 8.5–10.1)
CHLORIDE SERPL-SCNC: 112 MMOL/L (ref 98–112)
CO2 SERPL-SCNC: 25 MMOL/L (ref 21–32)
CREAT BLD-MCNC: 0.76 MG/DL
D DIMER PPP FEU-MCNC: 0.49 UG/ML FEU (ref ?–0.5)
EGFRCR SERPLBLD CKD-EPI 2021: 109 ML/MIN/1.73M2 (ref 60–?)
EOSINOPHIL # BLD AUTO: 0.03 X10(3) UL (ref 0–0.7)
EOSINOPHIL NFR BLD AUTO: 0.5 %
ERYTHROCYTE [DISTWIDTH] IN BLOOD BY AUTOMATED COUNT: 14.8 %
GLOBULIN PLAS-MCNC: 4.5 G/DL (ref 2.8–4.4)
GLUCOSE BLD-MCNC: 85 MG/DL (ref 70–99)
HCT VFR BLD AUTO: 38.1 %
HGB BLD-MCNC: 12.8 G/DL
IMM GRANULOCYTES # BLD AUTO: 0.01 X10(3) UL (ref 0–1)
IMM GRANULOCYTES NFR BLD: 0.2 %
LYMPHOCYTES # BLD AUTO: 2.28 X10(3) UL (ref 1–4)
LYMPHOCYTES NFR BLD AUTO: 35.6 %
MCH RBC QN AUTO: 25.9 PG (ref 26–34)
MCHC RBC AUTO-ENTMCNC: 33.6 G/DL (ref 31–37)
MCV RBC AUTO: 77.1 FL
MONOCYTES # BLD AUTO: 0.28 X10(3) UL (ref 0.1–1)
MONOCYTES NFR BLD AUTO: 4.4 %
NEUTROPHILS # BLD AUTO: 3.77 X10 (3) UL (ref 1.5–7.7)
NEUTROPHILS # BLD AUTO: 3.77 X10(3) UL (ref 1.5–7.7)
NEUTROPHILS NFR BLD AUTO: 58.7 %
OSMOLALITY SERPL CALC.SUM OF ELEC: 289 MOSM/KG (ref 275–295)
PLATELET # BLD AUTO: 288 10(3)UL (ref 150–450)
POTASSIUM SERPL-SCNC: 3.7 MMOL/L (ref 3.5–5.1)
PROT SERPL-MCNC: 7.5 G/DL (ref 6.4–8.2)
RBC # BLD AUTO: 4.94 X10(6)UL
SODIUM SERPL-SCNC: 139 MMOL/L (ref 136–145)
TROPONIN I SERPL HS-MCNC: 5 NG/L
WBC # BLD AUTO: 6.4 X10(3) UL (ref 4–11)

## 2024-10-06 PROCEDURE — 80053 COMPREHEN METABOLIC PANEL: CPT | Performed by: NURSE PRACTITIONER

## 2024-10-06 PROCEDURE — 99284 EMERGENCY DEPT VISIT MOD MDM: CPT

## 2024-10-06 PROCEDURE — 85025 COMPLETE CBC W/AUTO DIFF WBC: CPT | Performed by: NURSE PRACTITIONER

## 2024-10-06 PROCEDURE — S0028 INJECTION, FAMOTIDINE, 20 MG: HCPCS | Performed by: NURSE PRACTITIONER

## 2024-10-06 PROCEDURE — 84484 ASSAY OF TROPONIN QUANT: CPT | Performed by: NURSE PRACTITIONER

## 2024-10-06 PROCEDURE — 93005 ELECTROCARDIOGRAM TRACING: CPT

## 2024-10-06 PROCEDURE — 93010 ELECTROCARDIOGRAM REPORT: CPT

## 2024-10-06 PROCEDURE — 71046 X-RAY EXAM CHEST 2 VIEWS: CPT | Performed by: NURSE PRACTITIONER

## 2024-10-06 PROCEDURE — 85379 FIBRIN DEGRADATION QUANT: CPT | Performed by: NURSE PRACTITIONER

## 2024-10-06 PROCEDURE — 96374 THER/PROPH/DIAG INJ IV PUSH: CPT

## 2024-10-06 RX ORDER — FAMOTIDINE 20 MG/1
20 TABLET, FILM COATED ORAL 2 TIMES DAILY
Qty: 28 TABLET | Refills: 0 | Status: SHIPPED | OUTPATIENT
Start: 2024-10-06 | End: 2024-10-20

## 2024-10-06 RX ORDER — FAMOTIDINE 10 MG/ML
20 INJECTION, SOLUTION INTRAVENOUS ONCE
Status: COMPLETED | OUTPATIENT
Start: 2024-10-06 | End: 2024-10-06

## 2024-10-06 NOTE — ED PROVIDER NOTES
Patient Seen in: [facility] In [place]      History     Chief Complaint   Patient presents with    Chest Pain Angina     Stated Complaint: chest pain    Subjective:   HPI    29-year-old female with PCOS and rheumatoid arthritis presents today with complaints of sternal chest pain for 24 hours.  Patient states that she ate Chelsea cheese cake yesterday but the chest pain pain was present before she ate.  Patient denies any shortness of breath associated with it.  Patient states that the pain is reproducible with pressure to her chest.  Patient denies any recent travel.  Patient denies any family history of cardiac events.    Objective:     Past Medical History:    PCOS (polycystic ovarian syndrome)    Rheumatoid arthritis (HCC)              Past Surgical History:   Procedure Laterality Date    Hc  section level i      Removal gallbladder                  Social History     Socioeconomic History    Marital status: Single   Tobacco Use    Smoking status: Never     Passive exposure: Never    Smokeless tobacco: Never   Vaping Use    Vaping status: Never Used   Substance and Sexual Activity    Alcohol use: Yes     Comment: social    Drug use: Yes     Frequency: 3.0 times per week     Types: Cannabis    Sexual activity: Yes   Other Topics Concern    Caffeine Concern No    Stress Concern No    Weight Concern No    Special Diet No    Exercise No    Seat Belt Yes                  Physical Exam     ED Triage Vitals [10/06/24 1204]   BP (!) 140/92   Pulse 69   Resp 22   Temp 98.2 °F (36.8 °C)   Temp src Temporal   SpO2 100 %   O2 Device None (Room air)       Current Vitals:   Vital Signs  BP: 114/79  Pulse: 59  Resp: 17  Temp: 98.2 °F (36.8 °C)  Temp src: Temporal    Oxygen Therapy  SpO2: 100 %  O2 Device: None (Room air)        Physical Exam  Vitals and nursing note reviewed.   Constitutional:       Appearance: She is obese.   HENT:      Head: Normocephalic.   Eyes:      Pupils: Pupils are equal,  POD #3 D&C  U/S done 4/5 negative for retained products in uterus   round, and reactive to light.   Cardiovascular:      Rate and Rhythm: Normal rate and regular rhythm.      Pulses:           Radial pulses are 2+ on the right side and 2+ on the left side.      Heart sounds: Normal heart sounds.   Pulmonary:      Effort: Pulmonary effort is normal.      Breath sounds: Normal breath sounds.   Chest:      Chest wall: Tenderness present.      Comments: Pain elicited with palpation to the sternum.  Abdominal:      Palpations: Abdomen is soft.   Musculoskeletal:      Cervical back: Normal range of motion and neck supple.   Skin:     General: Skin is warm.      Capillary Refill: Capillary refill takes less than 2 seconds.   Neurological:      General: No focal deficit present.      Mental Status: She is alert.           ED Course     Labs Reviewed   CBC WITH DIFFERENTIAL WITH PLATELET - Abnormal; Notable for the following components:       Result Value    MCV 77.1 (*)     MCH 25.9 (*)     All other components within normal limits   COMP METABOLIC PANEL (14) - Abnormal; Notable for the following components:     (*)     Alkaline Phosphatase 124 (*)     Albumin 3.0 (*)     Globulin  4.5 (*)     A/G Ratio 0.7 (*)     All other components within normal limits   TROPONIN I HIGH SENSITIVITY - Normal   D-DIMER - Normal   RAINBOW DRAW LAVENDER   RAINBOW DRAW LIGHT GREEN   RAINBOW DRAW BLUE     EKG    Rate, intervals and axes as noted on EKG Report.  Rate: 49  Rhythm: Sinus Rhythm  Reading: SB                       MDM      29-year-old female with PCOS and rheumatoid arthritis presents today with complaints of sternal chest pain for 24 hours.  Patient states that she ate Chelsea cheese cake yesterday but the chest pain pain was present before she ate.  Patient denies any shortness of breath associated with it.  Patient states that the pain is reproducible with pressure to her chest.  Patient denies any recent travel.  Patient denies any family history of cardiac events.  Vital signs: Please  see EMR.  Physical exam: Please see exam.  Differential diagnosis: Cardiac event, heartburn, costochondritis.    Heart Score:    HEART Score      Title      Criteria Score   Age: <45 Age Score: 0   History: Slightly Suspicious Hx Score: 0     EKG: Normal EKG Score: 0   HTN: No   Hypercholesterolemia: No   Atherosclerosis/PVD: No     DM: No   BMI>30kg/m2: Yes   Smoking: No   Family History: No         Other Risk Factor Score: 1             Lab Results   Component Value Date    TROPHS 5 10/06/2024           HEART Score: 1        Risk of adverse cardiac event is 0.9-1.7%          Recent Results (from the past 24 hour(s))   EKG 12 Lead    Collection Time: 10/06/24 12:08 PM   Result Value Ref Range    Ventricular rate 49 BPM    Atrial rate 49 BPM    P-R Interval 166 ms    QRS Duration 88 ms    Q-T Interval 434 ms    QTC Calculation (Bezet) 392 ms    P Axis 43 degrees    R Axis 25 degrees    T Axis 24 degrees   CBC With Differential With Platelet    Collection Time: 10/06/24 12:14 PM   Result Value Ref Range    WBC 6.4 4.0 - 11.0 x10(3) uL    RBC 4.94 3.80 - 5.30 x10(6)uL    HGB 12.8 12.0 - 16.0 g/dL    HCT 38.1 35.0 - 48.0 %    .0 150.0 - 450.0 10(3)uL    MCV 77.1 (L) 80.0 - 100.0 fL    MCH 25.9 (L) 26.0 - 34.0 pg    MCHC 33.6 31.0 - 37.0 g/dL    RDW 14.8 %    Neutrophil Absolute Prelim 3.77 1.50 - 7.70 x10 (3) uL    Neutrophil Absolute 3.77 1.50 - 7.70 x10(3) uL    Lymphocyte Absolute 2.28 1.00 - 4.00 x10(3) uL    Monocyte Absolute 0.28 0.10 - 1.00 x10(3) uL    Eosinophil Absolute 0.03 0.00 - 0.70 x10(3) uL    Basophil Absolute 0.04 0.00 - 0.20 x10(3) uL    Immature Granulocyte Absolute 0.01 0.00 - 1.00 x10(3) uL    Neutrophil % 58.7 %    Lymphocyte % 35.6 %    Monocyte % 4.4 %    Eosinophil % 0.5 %    Basophil % 0.6 %    Immature Granulocyte % 0.2 %   Comp Metabolic Panel (14)    Collection Time: 10/06/24 12:14 PM   Result Value Ref Range    Glucose 85 70 - 99 mg/dL    Sodium 139 136 - 145 mmol/L    Potassium  3.7 3.5 - 5.1 mmol/L    Chloride 112 98 - 112 mmol/L    CO2 25.0 21.0 - 32.0 mmol/L    Anion Gap 2 0 - 18 mmol/L    BUN 18 9 - 23 mg/dL    Creatinine 0.76 0.55 - 1.02 mg/dL    Calcium, Total 8.8 8.5 - 10.1 mg/dL    Calculated Osmolality 289 275 - 295 mOsm/kg    eGFR-Cr 109 >=60 mL/min/1.73m2    AST 18 15 - 37 U/L     (H) 13 - 56 U/L    Alkaline Phosphatase 124 (H) 37 - 98 U/L    Bilirubin, Total 0.2 0.1 - 2.0 mg/dL    Total Protein 7.5 6.4 - 8.2 g/dL    Albumin 3.0 (L) 3.4 - 5.0 g/dL    Globulin  4.5 (H) 2.8 - 4.4 g/dL    A/G Ratio 0.7 (L) 1.0 - 2.0   Troponin I (High Sensitivity)    Collection Time: 10/06/24 12:14 PM   Result Value Ref Range    Troponin I (High Sensitivity) 5 <=54 ng/L   D-Dimer    Collection Time: 10/06/24 12:14 PM   Result Value Ref Range    D-Dimer 0.49 <0.50 ug/mL FEU   XR CHEST PA + LAT CHEST (CPT=71046)    Result Date: 10/6/2024  CONCLUSION:  No acute process   LOCATION:  [facility]   Dictated by (CST): Yaya Cruz MD on 10/06/2024 at 1:33 PM     Finalized by (CST): Yaya Cruz MD on 10/06/2024 at 1:33 PM      Based on physical exam and HPI will diagnose a heartburn.  Will prescribe famotidine twice daily for 7 days.  Patient is to follow-up with primary care provider within 1 week.  ED precautions given.                      Medical Decision Making  29-year-old female with PCOS and rheumatoid arthritis presents today with complaints of sternal chest pain for 24 hours.  Patient states that she ate Chelsea cheese cake yesterday but the chest pain pain was present before she ate.  Patient denies any shortness of breath associated with it.  Patient states that the pain is reproducible with pressure to her chest.  Patient denies any recent travel.  Patient denies any family history of cardiac events.    Problems Addressed:  Elevated liver enzymes: acute illness or injury  Heart burn: acute illness or injury    Amount and/or Complexity of Data Reviewed  Labs: ordered. Decision-making  details documented in ED Course.  Radiology: ordered. Decision-making details documented in ED Course.  ECG/medicine tests: ordered. Decision-making details documented in ED Course.    Risk  OTC drugs.        Disposition and Plan     Clinical Impression:  1. Elevated liver enzymes    2. Heart burn         Disposition:  Discharge  10/6/2024  1:36 pm    Follow-up:  Alexa Tucker DO  [address]  [address]  [facility] [address]  [phone]    Follow up in 1 week(s)            Medications Prescribed:  Current Discharge Medication List              Supplementary Documentation:

## 2024-10-06 NOTE — ED INITIAL ASSESSMENT (HPI)
Pt states having constant right upper chest, and intermittent mid sternal chest pain as well for the last 24 hrs. Feeling sob and dizzy earlier today  Denies injury

## 2024-10-06 NOTE — ED QUICK NOTES
Report was received from Nandini MOLINA. The pt states she's feeling better after being medicated. To x-ray per cart.

## 2024-10-06 NOTE — ED QUICK NOTES
The pt was given dc instr by Leydi MOLINA to f/u with the referral MD for her elevated liver enzymes. To return to the nearest ER if any problems are noted. She expressed an understanding and was dc'd home,in nad.

## 2024-10-07 LAB
ATRIAL RATE: 49 BPM
P AXIS: 43 DEGREES
P-R INTERVAL: 166 MS
Q-T INTERVAL: 434 MS
QRS DURATION: 88 MS
QTC CALCULATION (BEZET): 392 MS
R AXIS: 25 DEGREES
T AXIS: 24 DEGREES
VENTRICULAR RATE: 49 BPM

## 2024-10-08 ENCOUNTER — PATIENT OUTREACH (OUTPATIENT)
Dept: CASE MANAGEMENT | Age: 29
End: 2024-10-08

## 2024-10-08 NOTE — PROGRESS NOTES
ED follow up.    Alexa Tucker D.O.  [address] [address]  [phone]    Attempt #1:  Left message on voicemail for patient to call transitions specialist back to schedule follow up appointments. Provided Transitions specialist scheduling phone number [phone].

## 2024-10-09 NOTE — PROGRESS NOTES
ED follow up.    Alexa Tucker D.O.  [facility]  [address] [address] [address]  [phone]    Attempt #2:  Left message on voicemail for patient to call transitions specialist back to schedule follow up appointments. Provided Transitions specialist scheduling phone number [phone]. Closing encounter. Will re-open if patient returns call.

## 2024-11-09 ENCOUNTER — APPOINTMENT (OUTPATIENT)
Dept: CT IMAGING | Age: 29
End: 2024-11-09
Attending: EMERGENCY MEDICINE
Payer: COMMERCIAL

## 2024-11-09 ENCOUNTER — HOSPITAL ENCOUNTER (EMERGENCY)
Age: 29
Discharge: HOME | End: 2024-11-09
Attending: EMERGENCY MEDICINE
Payer: COMMERCIAL

## 2024-11-09 VITALS
HEART RATE: 78 BPM | HEIGHT: 67 IN | OXYGEN SATURATION: 98 % | TEMPERATURE: 98 F | RESPIRATION RATE: 18 BRPM | BODY MASS INDEX: 45.99 KG/M2 | WEIGHT: 293 LBS | SYSTOLIC BLOOD PRESSURE: 127 MMHG | DIASTOLIC BLOOD PRESSURE: 84 MMHG

## 2024-11-09 DIAGNOSIS — R51.9 FRONTAL HEADACHE: ICD-10-CM

## 2024-11-09 DIAGNOSIS — J01.10 ACUTE FRONTAL SINUSITIS, RECURRENCE NOT SPECIFIED: Primary | ICD-10-CM

## 2024-11-09 LAB — B-HCG UR QL: NEGATIVE

## 2024-11-09 PROCEDURE — 96372 THER/PROPH/DIAG INJ SC/IM: CPT

## 2024-11-09 PROCEDURE — 99284 EMERGENCY DEPT VISIT MOD MDM: CPT

## 2024-11-09 PROCEDURE — 81025 URINE PREGNANCY TEST: CPT

## 2024-11-09 PROCEDURE — 70450 CT HEAD/BRAIN W/O DYE: CPT | Performed by: EMERGENCY MEDICINE

## 2024-11-09 RX ORDER — KETOROLAC TROMETHAMINE 30 MG/ML
30 INJECTION, SOLUTION INTRAMUSCULAR; INTRAVENOUS ONCE
Status: COMPLETED | OUTPATIENT
Start: 2024-11-09 | End: 2024-11-09

## 2024-11-09 NOTE — DISCHARGE INSTRUCTIONS
Use a sinus rinse twice daily, once in the morning, once in the early evening.    Use an over-the-counter nasal steroid such as Flonase or Nasonex

## 2024-11-09 NOTE — ED PROVIDER NOTES
Patient Seen in: [facility] In [place]      History     Chief Complaint   Patient presents with    Headache     Stated Complaint: Frontal headache x 3 weeks.    Subjective:   HPI      3 weeks of frontal headache.  Usually in the morning, sometimes wakes her up.  Tension-like.  Resolves with Allegra-D and ibuprofen but tends to start returning slowly hours later.  No fevers, no neck symptoms, no phono photophobia.  Does have some postnasal drip.        Objective:     Past Medical History:    PCOS (polycystic ovarian syndrome)    Rheumatoid arthritis (HCC)              Past Surgical History:   Procedure Laterality Date    Hc  section level i      Removal gallbladder                  Social History     Socioeconomic History    Marital status: Single   Tobacco Use    Smoking status: Never     Passive exposure: Never    Smokeless tobacco: Never   Vaping Use    Vaping status: Never Used   Substance and Sexual Activity    Alcohol use: Yes     Comment: social    Drug use: Yes     Frequency: 3.0 times per week     Types: Cannabis    Sexual activity: Yes   Other Topics Concern    Caffeine Concern No    Stress Concern No    Weight Concern No    Special Diet No    Exercise No    Seat Belt Yes                  Physical Exam     ED Triage Vitals [24 0551]   /84   Pulse 78   Resp 18   Temp 97.7 °F (36.5 °C)   Temp src Temporal   SpO2 98 %   O2 Device        Current Vitals:   Vital Signs  BP: 127/84  Pulse: 78  Resp: 18  Temp: 97.7 °F (36.5 °C)  Temp src: Temporal    Oxygen Therapy  SpO2: 98 %        Physical Exam  Constitutional:  Appears well-developed and well-nourished.   Head: Normocephalic and atraumatic.   Nose: Nose normal.   Eyes: EOM are normal. Pupils are equal, round, and reactive to light.   Neck: Normal range of motion. Neck supple. No JVD present.   Cardiovascular: Normal rate and regular rhythm.    Pulmonary/Chest: Effort normal and breath sounds normal. No stridor.    Abdominal: Soft. There is no tenderness. There is no guarding.   Musculoskeletal: Exhibits no edema or tenderness.   Neurological: Pt is alert and oriented to person, place, and time.     There is no nystagmus.  There are no cranial nerve deficits.  Speech is fluent and not slurred.  There is no word finding difficulty.     Strength is 5 out of 5 in the upper extremities bilaterally.  Sensation is symmetric in the upper extremities and not diminished.    Normal strength and sensation bilateral lower extremities.      no cerebellar   no meningeal signs    Skin: Skin is warm and dry.   Psychiatric: Normal mood and affect. Thought content normal.       ED Course     Labs Reviewed   POCT PREGNANCY URINE - Normal            CT BRAIN OR HEAD (CPT=70450)    Result Date: 11/9/2024  PROCEDURE:  CT BRAIN OR HEAD (75285)  COMPARISON:  None.  INDICATIONS:  Frontal headache x 3 weeks.  TECHNIQUE:  Noncontrast CT scanning is performed through the brain. Dose reduction techniques were used. Dose information is transmitted to the ACR (American College of Radiology) NRDR (National Radiology Data Registry) which includes the Dose Index Registry.  PATIENT STATED HISTORY: (As transcribed by Technologist)  Patient has had a frontal headache for 3 weeks.    FINDINGS:  VENTRICLES/SULCI:  Ventricles and sulci are normal in size.  INTRACRANIAL:  There are no abnormal extraaxial fluid collections.  There is no midline shift.  There are no intraparenchymal brain abnormalities.  There is nothing specific for acute infarct.  There is no hemorrhage or mass lesion.  SINUSES:           No sign of acute sinusitis.  MASTOIDS:          No sign of acute inflammation. SKULL:             No evidence for fracture or osseous abnormality. OTHER:             None.            CONCLUSION:  No acute intracranial abnormality   LOCATION:  [facility]   Dictated by (CST): Enmanuel Humphreys MD on 11/09/2024 at 7:29 AM     Finalized by (CST): Enmanuel Humphreys MD on  11/09/2024 at 7:30 AM          Medications   ketorolac (Toradol) 30 MG/ML injection 30 mg (30 mg Intramuscular Given 11/9/24 0618)         Patient did feel better afterwards.       MDM            Differential diagnoses considered: Sinusitis, tension headache, migraine headache all considered.  Life-threatening intracranial hemorrhage is also consideration.    -CT scan negative.  -Clinically concern for sinusitis.  Will treat.  -Outpatient PCP follow-up.      I visualized the radiology studies, my independent interpretation: No large intraconal hemorrhage noted on CT scan of brain    *Discussion of ongoing management of this patient's care included: n/a  *Comorbidities contributing to the complexity of decision making: n/a  *External charts reviewed: n/a  *Additional sources of history: n/a    Shared decision making was done by: patient, myself.          Medical Decision Making      Disposition and Plan     Clinical Impression:  1. Acute frontal sinusitis, recurrence not specified    2. Frontal headache         Disposition:  Discharge  11/9/2024  7:34 am    Follow-up:  Alexa Tucker DO  [address] [place] [address]  [phone]    Follow up            Medications Prescribed:  Discharge Medication List as of 11/9/2024  7:34 AM        START taking these medications    Details   amoxicillin clavulanate 875-125 MG Oral Tab Take 1 tablet by mouth 2 (two) times daily for 10 days., Normal, Disp-20 tablet, R-0                 Supplementary Documentation:

## 2024-11-12 ENCOUNTER — PATIENT OUTREACH (OUTPATIENT)
Dept: CASE MANAGEMENT | Age: 29
End: 2024-11-12

## 2024-11-12 NOTE — PROGRESS NOTES
ED follow up.    Alexa Tucker DO  [place] [id number] [place] [address]  [phone]    Attempt #1:  Left message on voicemail for patient to call transitions specialist back to schedule follow up appointments. Provided Transitions specialist scheduling phone number [phone].

## 2024-11-13 NOTE — PROGRESS NOTES
ED follow up.    Alexa Tucker DO  [facility]  [address] [place] [address]  [phone]    Attempt #2:  Left message on voicemail for patient to call transitions specialist back to schedule follow up appointments. Provided Transitions specialist scheduling phone number [phone]. Closing encounter. Will re-open if patient returns call.

## 2024-11-24 ENCOUNTER — HOSPITAL ENCOUNTER (EMERGENCY)
Age: 29
Discharge: HOME | End: 2024-11-24
Attending: EMERGENCY MEDICINE
Payer: COMMERCIAL

## 2024-11-24 VITALS
HEART RATE: 89 BPM | SYSTOLIC BLOOD PRESSURE: 126 MMHG | HEIGHT: 67 IN | TEMPERATURE: 98 F | WEIGHT: 293 LBS | RESPIRATION RATE: 20 BRPM | OXYGEN SATURATION: 100 % | BODY MASS INDEX: 45.99 KG/M2 | DIASTOLIC BLOOD PRESSURE: 72 MMHG

## 2024-11-24 DIAGNOSIS — F41.9 ANXIETY: ICD-10-CM

## 2024-11-24 DIAGNOSIS — R55 SYNCOPE AND COLLAPSE: Primary | ICD-10-CM

## 2024-11-24 LAB
ALBUMIN SERPL-MCNC: 3.7 G/DL (ref 3.2–4.8)
ALBUMIN/GLOB SERPL: 1 {RATIO} (ref 1–2)
ALP LIVER SERPL-CCNC: 95 U/L
ALT SERPL-CCNC: 9 U/L
ANION GAP SERPL CALC-SCNC: 2 MMOL/L (ref 0–18)
AST SERPL-CCNC: 12 U/L (ref ?–34)
BASOPHILS # BLD AUTO: 0.04 X10(3) UL (ref 0–0.2)
BASOPHILS NFR BLD AUTO: 0.5 %
BILIRUB SERPL-MCNC: 0.2 MG/DL (ref 0.3–1.2)
BUN BLD-MCNC: 16 MG/DL (ref 9–23)
CALCIUM BLD-MCNC: 9.3 MG/DL (ref 8.7–10.4)
CHLORIDE SERPL-SCNC: 111 MMOL/L (ref 98–112)
CO2 SERPL-SCNC: 24 MMOL/L (ref 21–32)
CREAT BLD-MCNC: 0.79 MG/DL
EGFRCR SERPLBLD CKD-EPI 2021: 104 ML/MIN/1.73M2 (ref 60–?)
EOSINOPHIL # BLD AUTO: 0.07 X10(3) UL (ref 0–0.7)
EOSINOPHIL NFR BLD AUTO: 0.9 %
ERYTHROCYTE [DISTWIDTH] IN BLOOD BY AUTOMATED COUNT: 14.3 %
GLOBULIN PLAS-MCNC: 3.6 G/DL (ref 2–3.5)
GLUCOSE BLD-MCNC: 105 MG/DL (ref 70–99)
HCT VFR BLD AUTO: 36.7 %
HGB BLD-MCNC: 12.3 G/DL
IMM GRANULOCYTES # BLD AUTO: 0.02 X10(3) UL (ref 0–1)
IMM GRANULOCYTES NFR BLD: 0.3 %
LYMPHOCYTES # BLD AUTO: 2.83 X10(3) UL (ref 1–4)
LYMPHOCYTES NFR BLD AUTO: 35.5 %
MCH RBC QN AUTO: 26.2 PG (ref 26–34)
MCHC RBC AUTO-ENTMCNC: 33.5 G/DL (ref 31–37)
MCV RBC AUTO: 78.1 FL
MONOCYTES # BLD AUTO: 0.18 X10(3) UL (ref 0.1–1)
MONOCYTES NFR BLD AUTO: 2.3 %
NEUTROPHILS # BLD AUTO: 4.84 X10 (3) UL (ref 1.5–7.7)
NEUTROPHILS # BLD AUTO: 4.84 X10(3) UL (ref 1.5–7.7)
NEUTROPHILS NFR BLD AUTO: 60.5 %
OSMOLALITY SERPL CALC.SUM OF ELEC: 286 MOSM/KG (ref 275–295)
PLATELET # BLD AUTO: 333 10(3)UL (ref 150–450)
POTASSIUM SERPL-SCNC: 3.6 MMOL/L (ref 3.5–5.1)
PROT SERPL-MCNC: 7.3 G/DL (ref 5.7–8.2)
RBC # BLD AUTO: 4.7 X10(6)UL
SODIUM SERPL-SCNC: 137 MMOL/L (ref 136–145)
WBC # BLD AUTO: 8 X10(3) UL (ref 4–11)

## 2024-11-24 PROCEDURE — 93005 ELECTROCARDIOGRAM TRACING: CPT

## 2024-11-24 PROCEDURE — 80053 COMPREHEN METABOLIC PANEL: CPT | Performed by: EMERGENCY MEDICINE

## 2024-11-24 PROCEDURE — 93010 ELECTROCARDIOGRAM REPORT: CPT

## 2024-11-24 PROCEDURE — 36415 COLL VENOUS BLD VENIPUNCTURE: CPT

## 2024-11-24 PROCEDURE — 99284 EMERGENCY DEPT VISIT MOD MDM: CPT

## 2024-11-24 PROCEDURE — 85025 COMPLETE CBC W/AUTO DIFF WBC: CPT | Performed by: EMERGENCY MEDICINE

## 2024-11-24 RX ORDER — LORAZEPAM 1 MG/1
1 TABLET ORAL 2 TIMES DAILY PRN
Qty: 10 TABLET | Refills: 0 | Status: SHIPPED | OUTPATIENT
Start: 2024-11-24 | End: 2024-12-01

## 2024-11-25 NOTE — ED INITIAL ASSESSMENT (HPI)
Patient states she got up earlier today and \"woke up on the floor.\" Patient states she had a \"traumatic experience approx. 2 days ago. Patient states she feels anxious and has a history of anxiety. Patient complains of left sided chest tightness. Patient states, \"I am an emotional wreck right now.\" Patient appears anxious in triage. Patient also complains of headache for several days. Patient denies neck/back pain. No obvious trauma noted.
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
## 2024-11-27 LAB
ATRIAL RATE: 92 BPM
P AXIS: 52 DEGREES
P-R INTERVAL: 166 MS
Q-T INTERVAL: 350 MS
QRS DURATION: 82 MS
QTC CALCULATION (BEZET): 432 MS
R AXIS: 25 DEGREES
T AXIS: 29 DEGREES
VENTRICULAR RATE: 92 BPM

## 2024-12-03 ENCOUNTER — TELEPHONE (OUTPATIENT)
Dept: FAMILY MEDICINE CLINIC | Facility: CLINIC | Age: 29
End: 2024-12-03

## 2024-12-03 NOTE — TELEPHONE ENCOUNTER
Forms were received by:_X_Fax         ___MyChart         ___Dropped off         ___Mail    From:        Reason for Form Completion:      [facility] Signed    ___Yes       _X__No    Fee Collected    ___Yes       __X_No    Emailed to self and Forms Department.  Sent inner office mail to Forms Department.

## 2024-12-05 ENCOUNTER — OFFICE VISIT (OUTPATIENT)
Dept: RHEUMATOLOGY | Facility: CLINIC | Age: 29
End: 2024-12-05
Payer: COMMERCIAL

## 2024-12-05 VITALS
BODY MASS INDEX: 45.99 KG/M2 | HEIGHT: 67 IN | SYSTOLIC BLOOD PRESSURE: 144 MMHG | DIASTOLIC BLOOD PRESSURE: 94 MMHG | HEART RATE: 98 BPM | WEIGHT: 293 LBS | RESPIRATION RATE: 16 BRPM

## 2024-12-05 DIAGNOSIS — M06.00 SERONEGATIVE RHEUMATOID ARTHRITIS (HCC): Primary | ICD-10-CM

## 2024-12-05 DIAGNOSIS — M06.4 INFLAMMATORY POLYARTHRITIS (HCC): ICD-10-CM

## 2024-12-05 PROCEDURE — 96372 THER/PROPH/DIAG INJ SC/IM: CPT | Performed by: INTERNAL MEDICINE

## 2024-12-05 PROCEDURE — 3080F DIAST BP >= 90 MM HG: CPT | Performed by: INTERNAL MEDICINE

## 2024-12-05 PROCEDURE — 3008F BODY MASS INDEX DOCD: CPT | Performed by: INTERNAL MEDICINE

## 2024-12-05 PROCEDURE — 99205 OFFICE O/P NEW HI 60 MIN: CPT | Performed by: INTERNAL MEDICINE

## 2024-12-05 PROCEDURE — 3077F SYST BP >= 140 MM HG: CPT | Performed by: INTERNAL MEDICINE

## 2024-12-05 RX ORDER — PREDNISONE 5 MG/1
TABLET ORAL
Qty: 60 TABLET | Refills: 0 | Status: SHIPPED | OUTPATIENT
Start: 2024-12-05 | End: 2025-01-16

## 2024-12-05 RX ORDER — METHYLPREDNISOLONE ACETATE 40 MG/ML
40 INJECTION, SUSPENSION INTRA-ARTICULAR; INTRALESIONAL; INTRAMUSCULAR; SOFT TISSUE ONCE
Status: COMPLETED | OUTPATIENT
Start: 2024-12-05 | End: 2024-12-05

## 2024-12-05 RX ADMIN — METHYLPREDNISOLONE ACETATE 40 MG: 40 INJECTION, SUSPENSION INTRA-ARTICULAR; INTRALESIONAL; INTRAMUSCULAR; SOFT TISSUE at 14:15:00

## 2024-12-05 NOTE — PROGRESS NOTES
RHEUMATOLOGY CLINIC- NEW PATIENT    Pat Leiva is a 29 year old female.    ASSESSMENT/PLAN:       ICD-10-CM    1. Seronegative rheumatoid arthritis (HCC)  M06.00 Hepatitis Panel, Acute (4)     Quantiferon TB Plus     methylPREDNISolone acetate (DEPO-medrol) 40 MG/ML injection 40 mg      2. Inflammatory polyarthritis (HCC)  M06.4 Hepatitis Panel, Acute (4)     Quantiferon TB Plus     methylPREDNISolone acetate (DEPO-medrol) 40 MG/ML injection 40 mg          DISCUSSION:  Patient presents as a new outpatient, after being lost to follow-up since 2017.  Prior MRI notable for synovitis consistent with underlying inflammatory arthropathy.  Prior RF/CCP/TETE negative so presentation consistent with underlying seronegative rheumatoid arthritis.  She does have some swelling on current exam.  Given her current symptoms, I am Depo-Medrol performed in office today and will plan for a p.o. prednisone taper starting tomorrow morning.  Additionally, discussed with patient risk and benefit of resuming methotrexate to which she is agreeable pending infection screening test.  States she recently had tuberculosis testing done with work so we will have these results transferred to our office.  Denies family-planning or current pregnancy.    PLAN:  -IM Depo-Medrol 40 mg performed in office today without complication.  Starting tomorrow, 12/6/2024, will plan for p.o. prednisone taper starting at 20 mg q AM.  Patient to contact office if flares on lower dosing, as some patients require some type of maintenance dosing.  - Patient states had tuberculosis testing done recently with work and will have this record given to our office.  Otherwise, will also need hepatitis screening test.  - Pending lab work, will plan on resuming methotrexate at:methotrexate 2.5 mg tablets x 5 tabs qw with daily folic acid 1 mg. Side effects of MTX including possible alopecia, hepatotoxicity, mucositis, GI upset, as well as immunosuppressive effects.  Also,  emphasized importance of avoiding EtOH while on MTX.  Explained that MTX may take several weeks to months for noticeable symptom improvement.  - Discussed with patient, given possible immunosuppressive effects of MTX, the importance of keeping vaccinations up-to-date.    - We will monitor labs every 4-6 weeks while on MTX until dosing stabilized.  Then may consider trending labs every 3 months.   - Consult/evaluation communicated with referring physician/provider.    I will send the prescription for methotrexate and folic acid pending infection screening test.  Otherwise, patient to follow-up in 6-8 weeks with labs drawn prior.    Srini Dyson DO  12/5/2024   1:30 PM      Spent 60 minutes including about 40 minutes of face to face time obtaining history, evaluating patient, and discussing treatment options as well as reviewing prior notes for other providers, reviewing labs, and completing documentation      HPI:     12/5/2024 Initial Consult: I had the pleasure of seeing Pat Leiva on 12/5/2024 as a new outpatient consultation for polyarthralgias. The patient was originally referred by her PCP.     29 year old female w/ PMH PCOS, prediabetes who presents to clinic today.Of note, patient formally following with Dr. Yoder with last appointment 10/17/2017 for suspected seronegative rheumatoid arthritis.  Prior MRI with synovitis affecting her right hand.  During that appointment, patient prescribed a prednisone taper and patient started on methotrexate with folic acid daily.  Plans follow-up in 1 month.    Since giving birth to her daughter about a year and a half ago, has been having worsening polyarthralgias including her knees, ankles, hands, arms.  Sometimes the pain can make it hard for her to walk, with her experiencing falls as if her knees and ankles are \"giving out \".  She works as a mental health  with recent work injury as well.  States that she has been having these arthralgias since  misael year of high school.  Has AM stiffness until lunchtime.  Also has a gelling phenomenon with worsening symptoms after rest, such as lying on the couch with her daughter.  She is not family-planning and does not plan to become pregnant again.  Symptoms corticosteroid responsive in the past.  Also notes an occasional itchy rash affecting her hands and knees which resolves with Allegra-D.  No measured unexplained fevers, chills, photosensitive rash, Raynaud's phenomenon, oral/nasal ulcers, serositis.  Family history notable for a mother with psoriasis, father with gout, distal paternal cousin with RA.  She is unsure whether methotrexate helped in the past.      Current Medications:  Ibuprofen-helpful     Medication History:  Corticosteroid-Responsive    Interval History:   See Above    HISTORY:  Past Medical History:    PCOS (polycystic ovarian syndrome)    Rheumatoid arthritis (HCC)      Social Hx Reviewed   Family Hx Reviewed     Medications (Active prior to today's visit):  Current Outpatient Medications   Medication Sig Dispense Refill    escitalopram 10 MG Oral Tab Take 1 tablet (10 mg total) by mouth every morning. 30 tablet 0    ibuprofen (MOTRIN) 200 MG Oral Tab Take 1 tablet (200 mg total) by mouth every 6 (six) hours as needed.         Allergies:  Allergies[1]      ROS:   Review of Systems   Constitutional:  Negative for chills and fever.   HENT:  Negative for congestion, hearing loss, mouth sores, nosebleeds and trouble swallowing.    Eyes:  Negative for photophobia, pain, redness and visual disturbance.   Respiratory:  Negative for cough and shortness of breath.    Cardiovascular:  Negative for chest pain, palpitations and leg swelling.   Gastrointestinal:  Negative for abdominal pain, blood in stool, diarrhea and nausea.   Endocrine: Negative for cold intolerance and heat intolerance.   Genitourinary:  Negative for dysuria, frequency and hematuria.   Musculoskeletal:  Positive for arthralgias, gait  problem and joint swelling. Negative for back pain, myalgias, neck pain and neck stiffness.   Skin:  Positive for rash. Negative for color change.   Neurological:  Negative for dizziness, weakness, numbness and headaches.   Psychiatric/Behavioral:  Negative for confusion and dysphoric mood.         PHYSICAL EXAM:     Constitutional:  Well developed, Well nourished, No acute distress  HENT:  Normocephalic, Atraumatic, Bilateral external ears normal, Oropharynx moist, No oral exudates.  Neck: Normal range of motion, No tenderness, Supple, No stridor.  Eyes:  PERRL, EOMI, Conjunctiva normal, No discharge.  Respiratory:  Normal breath sounds, No respiratory distress, No wheezing.  Cardiovascular:  Normal heart rate, Normal rhythm, No murmurs, No rubs, No gallops.  GI:  Bowel sounds normal, Soft, No tenderness, No masses, No pulsatile masses.  : No CVA tenderness.  Musculoskeletal:  A comprehensive 28 count joint exam was done and was negative for swelling or tenderness except as noted. Inspections for misalignment, asymmetry, crepitation, defects, tenderness, masses, nodules, effusions, range of motion, and stability in the upper and lower extremities bilaterally are all normal unless noted.           Integument:  Warm, Dry, No erythema, No rash.  Lymphatic:  No lymphadenopathy noted.  Neurologic:  Alert & oriented x 3, Normal motor function, Normal sensory function, No focal deficits noted.  Psychiatric:  Affect normal, Judgment normal, Mood normal.    LABS:     Prior lab work reviewed and notable for:     2024:  CMP with BUN 16, CR 0.79, LFTs not elevated, no gamma gap  CBC with normal WBC, Hg, PLT    2018:  ESR 33 (slightly high), CRP 0.79 WNL    2017:  RF less than 5 WNL, CCP 2 WNL    2015:  ESR 38 (high), CRP 1 borderline  SSA/SSB, TETE screen negative  C3 162 WNL, C4 37 WNL  CCP 0.8 WNL, RF less than 3 WNL  ACE 59 WNL  TSH 0.9 WNL    2014:  CK 35 (low)  Imagin2017 right hand  XR:  Impression   CONCLUSION: There is intercarpal joint space narrowing with degenerative change in the scaphoid, lunate, capitate, and distal radius, which can be seen with arthritis.  The changes have progressed/developed since prior exams.       5/22/2015 right wrist MRI:  CONCLUSION:   1. Marked synovitis involving the carpus and second through fifth      carpometacarpal joints with associated thickened enhancing synovium,      joint effusions and reactive bone edema. Main differential      considerations include rheumatoid arthritis, less likely gout and      infection.   2. Mild reactive edema in the thenar musculature.   3. Tiny pinhole-sized perforation the midportion of scapholunate ligament.      Volar and dorsal aspects of scapholunate ligament intact.               [1]   Allergies  Allergen Reactions    Gramineae Pollens SHORTNESS OF BREATH    Pollen Extract SHORTNESS OF BREATH    Wheat HIVES, ITCHING and UNKNOWN     Allergy no reaction on file    Wheat Extract SHORTNESS OF BREATH    Naphazoline-Glycerin-Zinc Sulf OTHER (SEE COMMENTS)     Grass    Seasonal      Grass      Wheat Bran      Allergy no reaction on file

## 2024-12-06 NOTE — TELEPHONE ENCOUNTER
Patient called to confirm if Family Medical Leave Act forms were received. Forms received via email. Logged for processing. [employer] message sent for Release of Information completion.   Type of Leave: continuous Family Medical Leave Act   Reason for Leave: anxiety   Start date of leave: 12/4/24  End date of leave: 1/5/25, rtw 1/6/25 (approval in MD [place] notes)

## 2024-12-10 RX ORDER — IBUPROFEN 800 MG/1
800 TABLET, FILM COATED ORAL EVERY 8 HOURS PRN
Qty: 90 TABLET | Refills: 2 | Status: SHIPPED | OUTPATIENT
Start: 2024-12-10

## 2024-12-18 ENCOUNTER — PATIENT MESSAGE (OUTPATIENT)
Dept: FAMILY MEDICINE CLINIC | Facility: CLINIC | Age: 29
End: 2024-12-18

## 2024-12-18 NOTE — TELEPHONE ENCOUNTER
Dr. Tucker    Please sign off on form if you agree to: Family Medical Leave Act for anxiety    -Signature page will be the first page scanned  -From your Inbasket, Highlight the patient and click Chart   -Double click the 12/3/24 Forms Completion telephone encounter  -Scroll down to the Media section   -Click the blue Hyperlink: Family Medical Leave Act Dr. Tucker 12/18/24  -Click Acknowledge located in the top right ribbon/menu   -Drag the mouse into the blank space of the document and a + sign will appear. Left click to   electronically sign the document.  -Once signed, simply exit out of the screen and you signature will be saved.     Thank you,  Sania

## 2024-12-18 NOTE — TELEPHONE ENCOUNTER
Patient called to check the status of Family Medical Leave Act of paperwork. Advised patient forms has been completed and is awaiting provider's signature. Patient also wanted to know if short term disability forms has been received, located forms and logged them for processing.

## 2024-12-19 NOTE — TELEPHONE ENCOUNTER
Dr. Phillips      Please sign off on form if you agree to: disability   (place your signature on the first page only)    -From your Inbasket, Highlight the patient and click Chart   -Double click the  12/03/2024 Forms Completion telephone encounter  -Scroll down to the Media section   -Click the blue Hyperlink: disability Dr Tucker 12/19/2024  -Click Acknowledge located in the top right ribbon/menu   -Drag the mouse into the blank space of the document and a + sign will appear. Left click to   electronically sign the document.     Thank you,    Jarred'

## 2024-12-19 NOTE — TELEPHONE ENCOUNTER
Future Appointments   Date Time Provider Department Center   1/3/2025  9:20 AM Adriel Evans APRN [facility] [address] [facility] [address]   1/16/2025  1:30 PM Srini Dyson DO [facility]   2/24/2025  1:00 PM Bryanna Lara APRN [facility] [address]

## 2024-12-28 ENCOUNTER — HOSPITAL ENCOUNTER (EMERGENCY)
Age: 29
Discharge: HOME | End: 2024-12-29
Attending: EMERGENCY MEDICINE
Payer: COMMERCIAL

## 2024-12-28 DIAGNOSIS — K52.9 GASTROENTERITIS: Primary | ICD-10-CM

## 2024-12-28 PROCEDURE — 99285 EMERGENCY DEPT VISIT HI MDM: CPT

## 2024-12-28 PROCEDURE — 93005 ELECTROCARDIOGRAM TRACING: CPT

## 2024-12-28 PROCEDURE — 93010 ELECTROCARDIOGRAM REPORT: CPT

## 2024-12-28 PROCEDURE — S0119 ONDANSETRON 4 MG: HCPCS

## 2024-12-28 RX ORDER — ONDANSETRON 4 MG/1
4 TABLET, ORALLY DISINTEGRATING ORAL ONCE
Status: COMPLETED | OUTPATIENT
Start: 2024-12-28 | End: 2024-12-28

## 2024-12-28 RX ORDER — ONDANSETRON 4 MG/1
TABLET, ORALLY DISINTEGRATING ORAL
Status: COMPLETED
Start: 2024-12-28 | End: 2024-12-28

## 2024-12-29 ENCOUNTER — APPOINTMENT (OUTPATIENT)
Dept: CT IMAGING | Age: 29
End: 2024-12-29
Attending: EMERGENCY MEDICINE
Payer: COMMERCIAL

## 2024-12-29 VITALS
RESPIRATION RATE: 15 BRPM | DIASTOLIC BLOOD PRESSURE: 67 MMHG | TEMPERATURE: 99 F | WEIGHT: 293 LBS | SYSTOLIC BLOOD PRESSURE: 125 MMHG | BODY MASS INDEX: 45.99 KG/M2 | HEART RATE: 75 BPM | HEIGHT: 67 IN | OXYGEN SATURATION: 99 %

## 2024-12-29 LAB
ALBUMIN SERPL-MCNC: 3.8 G/DL (ref 3.2–4.8)
ALBUMIN/GLOB SERPL: 1.1 {RATIO} (ref 1–2)
ALP LIVER SERPL-CCNC: 82 U/L
ALT SERPL-CCNC: 9 U/L
ANION GAP SERPL CALC-SCNC: 7 MMOL/L (ref 0–18)
AST SERPL-CCNC: 11 U/L (ref ?–34)
B-HCG UR QL: NEGATIVE
BASOPHILS # BLD AUTO: 0.03 X10(3) UL (ref 0–0.2)
BASOPHILS NFR BLD AUTO: 0.3 %
BILIRUB SERPL-MCNC: 0.3 MG/DL (ref 0.3–1.2)
BUN BLD-MCNC: 12 MG/DL (ref 9–23)
CALCIUM BLD-MCNC: 9.4 MG/DL (ref 8.7–10.4)
CHLORIDE SERPL-SCNC: 106 MMOL/L (ref 98–112)
CO2 SERPL-SCNC: 26 MMOL/L (ref 21–32)
CREAT BLD-MCNC: 0.74 MG/DL
EGFRCR SERPLBLD CKD-EPI 2021: 113 ML/MIN/1.73M2 (ref 60–?)
EOSINOPHIL # BLD AUTO: 0.07 X10(3) UL (ref 0–0.7)
EOSINOPHIL NFR BLD AUTO: 0.7 %
ERYTHROCYTE [DISTWIDTH] IN BLOOD BY AUTOMATED COUNT: 14.4 %
GLOBULIN PLAS-MCNC: 3.5 G/DL (ref 2–3.5)
GLUCOSE BLD-MCNC: 99 MG/DL (ref 70–99)
HCG SERPL QL: NEGATIVE
HCT VFR BLD AUTO: 39.3 %
HGB BLD-MCNC: 13.3 G/DL
IMM GRANULOCYTES # BLD AUTO: 0.03 X10(3) UL (ref 0–1)
IMM GRANULOCYTES NFR BLD: 0.3 %
LIPASE SERPL-CCNC: 26 U/L (ref 12–53)
LYMPHOCYTES # BLD AUTO: 2.24 X10(3) UL (ref 1–4)
LYMPHOCYTES NFR BLD AUTO: 21.9 %
MCH RBC QN AUTO: 26.6 PG (ref 26–34)
MCHC RBC AUTO-ENTMCNC: 33.8 G/DL (ref 31–37)
MCV RBC AUTO: 78.6 FL
MONOCYTES # BLD AUTO: 0.27 X10(3) UL (ref 0.1–1)
MONOCYTES NFR BLD AUTO: 2.6 %
NEUTROPHILS # BLD AUTO: 7.59 X10 (3) UL (ref 1.5–7.7)
NEUTROPHILS # BLD AUTO: 7.59 X10(3) UL (ref 1.5–7.7)
NEUTROPHILS NFR BLD AUTO: 74.2 %
OSMOLALITY SERPL CALC.SUM OF ELEC: 288 MOSM/KG (ref 275–295)
PLATELET # BLD AUTO: 413 10(3)UL (ref 150–450)
POCT INFLUENZA A: NEGATIVE
POCT INFLUENZA B: NEGATIVE
POTASSIUM SERPL-SCNC: 3.4 MMOL/L (ref 3.5–5.1)
PROT SERPL-MCNC: 7.3 G/DL (ref 5.7–8.2)
RBC # BLD AUTO: 5 X10(6)UL
SARS-COV-2 RNA RESP QL NAA+PROBE: NOT DETECTED
SODIUM SERPL-SCNC: 139 MMOL/L (ref 136–145)
TROPONIN I SERPL HS-MCNC: <3 NG/L
WBC # BLD AUTO: 10.2 X10(3) UL (ref 4–11)

## 2024-12-29 PROCEDURE — 83690 ASSAY OF LIPASE: CPT | Performed by: EMERGENCY MEDICINE

## 2024-12-29 PROCEDURE — 96374 THER/PROPH/DIAG INJ IV PUSH: CPT

## 2024-12-29 PROCEDURE — 84703 CHORIONIC GONADOTROPIN ASSAY: CPT | Performed by: EMERGENCY MEDICINE

## 2024-12-29 PROCEDURE — 96375 TX/PRO/DX INJ NEW DRUG ADDON: CPT

## 2024-12-29 PROCEDURE — 74177 CT ABD & PELVIS W/CONTRAST: CPT | Performed by: EMERGENCY MEDICINE

## 2024-12-29 PROCEDURE — 85025 COMPLETE CBC W/AUTO DIFF WBC: CPT | Performed by: EMERGENCY MEDICINE

## 2024-12-29 PROCEDURE — 96361 HYDRATE IV INFUSION ADD-ON: CPT

## 2024-12-29 PROCEDURE — 87502 INFLUENZA DNA AMP PROBE: CPT | Performed by: EMERGENCY MEDICINE

## 2024-12-29 PROCEDURE — 84484 ASSAY OF TROPONIN QUANT: CPT | Performed by: EMERGENCY MEDICINE

## 2024-12-29 PROCEDURE — 80053 COMPREHEN METABOLIC PANEL: CPT | Performed by: EMERGENCY MEDICINE

## 2024-12-29 PROCEDURE — 81025 URINE PREGNANCY TEST: CPT

## 2024-12-29 PROCEDURE — 96372 THER/PROPH/DIAG INJ SC/IM: CPT

## 2024-12-29 RX ORDER — DICYCLOMINE HYDROCHLORIDE 10 MG/ML
20 INJECTION INTRAMUSCULAR ONCE
Status: COMPLETED | OUTPATIENT
Start: 2024-12-29 | End: 2024-12-29

## 2024-12-29 RX ORDER — ONDANSETRON 4 MG/1
4 TABLET, ORALLY DISINTEGRATING ORAL EVERY 8 HOURS PRN
Qty: 20 TABLET | Refills: 0 | Status: SHIPPED | OUTPATIENT
Start: 2024-12-29 | End: 2025-01-05

## 2024-12-29 RX ORDER — DICYCLOMINE HCL 20 MG
20 TABLET ORAL 3 TIMES DAILY
Qty: 20 TABLET | Refills: 0 | Status: SHIPPED | OUTPATIENT
Start: 2024-12-29

## 2024-12-29 RX ORDER — KETOROLAC TROMETHAMINE 15 MG/ML
15 INJECTION, SOLUTION INTRAMUSCULAR; INTRAVENOUS ONCE
Status: COMPLETED | OUTPATIENT
Start: 2024-12-29 | End: 2024-12-29

## 2024-12-29 RX ORDER — ONDANSETRON 2 MG/ML
4 INJECTION INTRAMUSCULAR; INTRAVENOUS ONCE
Status: COMPLETED | OUTPATIENT
Start: 2024-12-29 | End: 2024-12-29

## 2024-12-29 NOTE — ED PROVIDER NOTES
Patient Seen in: [facility] [place]      History     Chief Complaint   Patient presents with    Nausea/Vomiting/Diarrhea    Chest Pain     Stated Complaint: Pt to ER for abd cramping and 17 episodes of vomiting, symptoms onset last nigh*    Subjective:   HPI      Patient is a 29-year-old female presents to ED for evaluation of vomiting, diarrhea, abdominal pain.  Patient symptoms started last evening about an hour after eating [employer].  She states she is thrown up about 17 times and had 4 episodes of diarrhea.  She complains of abdominal pain generalized lasting for about a minute occurring every 3 to 4 minutes.  History of , cholecystectomy.  Patient denies other complaints.  No fever.  Denies urinary symptoms..  No cough or cold symptoms.  Was on antibiotics about a month ago.  Denies travel outside the country    Objective:     Past Medical History:    PCOS (polycystic ovarian syndrome)    Rheumatoid arthritis (HCC)              Past Surgical History:   Procedure Laterality Date    Hc  section level i      Removal gallbladder                  Social History     Socioeconomic History    Marital status: Single   Tobacco Use    Smoking status: Never     Passive exposure: Never    Smokeless tobacco: Never   Vaping Use    Vaping status: Never Used   Substance and Sexual Activity    Alcohol use: Yes     Comment: social    Drug use: Yes     Frequency: 3.0 times per week     Types: Cannabis    Sexual activity: Yes   Other Topics Concern    Caffeine Concern No    Stress Concern No    Weight Concern No    Special Diet No    Exercise No    Seat Belt Yes                  Physical Exam     ED Triage Vitals [24]   BP (!) 148/104   Pulse 106   Resp 24   Temp 98.3 °F (36.8 °C)   Temp src Temporal   SpO2 96 %   O2 Device None (Room air)       Current Vitals:   Vital Signs  BP: 125/67  Pulse: 75  Resp: 15  Temp: 98.5 °F (36.9 °C)  Temp src: Oral    Oxygen Therapy  SpO2: 99 %  O2  Device: None (Room air)        Physical Exam  GENERAL: No acute distress, well appearing and non-toxic, Alert and oriented X 3   HEENT: Normocephalic, atraumatic.  Moist mucous membranes.  Pupils equal round reactive to light and accommodation, extraocular motion is intact, sclerae white, conjunctiva is pink.  Oropharynx is unremarkable, no exudate.  NECK: Supple, trachea midline, no lymphadenopathy.   LUNG: Lungs clear to auscultation bilaterally, no wheezing, no rales, no rhonchi.  CARDIOVASCULAR: Regular rate and rhythm.  Normal S1S2.  No S3S4 or murmur.  ABDOMEN: Bowel sounds are present. Soft. nondistended, no pulsatile masses.  Mild diffuse abdominal tenderness.  No rebound, guarding or rigidity  MUSCULOSKELETAL: No calf tenderness.  Dorsalis and Posterior Tibial pulses present. No clubbing. No cyanosis.  No edema.   SKIN EXAMINATIoN: Warm and dry with normal appearance.  No rashes or lesions.  NEUROLOGICAL:  Motor strength intact all groups.  normal sensation, speech intact    ED Course     Labs Reviewed   CBC WITH DIFFERENTIAL WITH PLATELET - Abnormal; Notable for the following components:       Result Value    MCV 78.6 (*)     All other components within normal limits   COMP METABOLIC PANEL (14) - Abnormal; Notable for the following components:    Potassium 3.4 (*)     ALT 9 (*)     All other components within normal limits   LIPASE - Normal   HCG, BETA SUBUNIT, QUAL - Normal   TROPONIN I HIGH SENSITIVITY - Normal   POCT PREGNANCY URINE - Normal   RAPID SARS-COV-2 BY PCR - Normal   POCT FLU TEST - Normal    Narrative:     This assay is a rapid molecular in vitro test utilizing nucleic acid amplification of influenza A and B viral RNA.     EKG    Rate, intervals and axes as noted on EKG Report.  Rate: 107  Rhythm: Sinus Rhythm  Reading: Sinus tachycardia without acute changes                I personally reviewd CT images of abdomen and pelvis and independent interpretation shows no obvious bowel obstruction.   I also viewed formal radiology report as read by radiology with findings below:    CT of abdomen and pelvis read by [facility] rad radiology shows moderate to severe wall thickening involving multiple loops of mid to distal small bowel compatible with enteritis.    Medications   ondansetron (Zofran-ODT) disintegrating tab 4 mg (0 mg Oral Override Pull 12/28/24 2224)   sodium chloride 0.9 % IV bolus 1,000 mL (0 mL Intravenous Stopped 12/29/24 0212)   ondansetron (Zofran) 4 MG/2ML injection 4 mg (4 mg Intravenous Given 12/29/24 0035)   dicyclomine (Bentyl) 10 MG/ML injection 20 mg (20 mg Intramuscular Given 12/29/24 0035)   ketorolac (Toradol) 15 MG/ML injection 15 mg (15 mg Intravenous Given 12/29/24 0034)   iopamidol 76% (ISOVUE-370) injection for power injector (100 mL Intravenous Given 12/29/24 0127)        MDM      Patient is a 29-year-old female presents to ED for evaluation of vomiting, diarrhea, abdominal pain.  History of cholecystectomy in the past but differential bowel obstruction, gastroenteritis, dehydration, electro abnormality, COVID, flu.  Laboratory testing including respiratory swabs unremarkable.  CT Abdo pelvis consistent with enteritis.  She was hydrated given antispasmodics, antiemetics and Toradol with improvement in symptoms.  Symptoms consistent with gastroenteritis.  Patient will be discharged with prescription for antiemetic. Recommend Imodium over-the-counter. Push fluids. BRAT diet and advance as tolerated.I discussed the case with the patient and they had no questions, complaints, or concerns.  Patient felt comfortable going home.    Patient was screened and evaluated during this visit.   As a treating physician attending to the patient, I determined, within reasonable clinical confidence and prior to discharge, that an emergency medical condition was not or was no longer present.  There was no indication for further evaluation, treatment or admission on an emergency basis.  Comprehensive  verbal and written discharge and follow-up instructions were provided to help prevent relapse or worsening.  Patient was instructed to follow-up with her primary care provider for further evaluation and treatment, but to return immediately to the ER for worsening, concerning, new, changing or persisting symptoms.  I discussed the case with the patient and they had no questions, complaints, or concerns.  Patient felt comfortable going home.            Medical Decision Making      Disposition and Plan     Clinical Impression:  1. Gastroenteritis         Disposition:  Discharge  12/29/2024  3:07 am    Follow-up:  Alexa Tucker [place]  [id number] [address] [address]  [phone]    Follow up  As needed          Medications Prescribed:  Discharge Medication List as of 12/29/2024  3:08 AM        START taking these medications    Details   ondansetron 4 MG Oral Tablet Dispersible Take 1 tablet (4 mg total) by mouth every 8 (eight) hours as needed for Nausea., Normal, Disp-20 tablet, R-0      dicyclomine 20 MG Oral Tab Take 1 tablet (20 mg total) by mouth 3 (three) times daily., Normal, Disp-20 tablet, R-0                 Supplementary Documentation:

## 2024-12-29 NOTE — DISCHARGE INSTRUCTIONS
Return if further problems     Zofran for nausea    Imodium for diarrhea    Push fluids    Soft diet and advance as tolerated

## 2024-12-29 NOTE — ED INITIAL ASSESSMENT (HPI)
Pt c/o abdominal pain, diarrhea, and reported vomiting  about 17 times since last night after eating a fajita.

## 2024-12-30 LAB
ATRIAL RATE: 107 BPM
P AXIS: 56 DEGREES
P-R INTERVAL: 146 MS
Q-T INTERVAL: 332 MS
QRS DURATION: 80 MS
QTC CALCULATION (BEZET): 443 MS
R AXIS: 53 DEGREES
T AXIS: 22 DEGREES
VENTRICULAR RATE: 107 BPM

## 2025-01-08 ENCOUNTER — TELEPHONE (OUTPATIENT)
Dept: FAMILY MEDICINE CLINIC | Facility: CLINIC | Age: 30
End: 2025-01-08

## 2025-01-08 NOTE — TELEPHONE ENCOUNTER
Patient calling, patient was seen on 1-3. Patient requesting to extend FAMILY MEDICAL LEAVE ACT, HR department is requesting clarification on why leave is needed. Will need new note.

## 2025-01-13 ENCOUNTER — LAB ENCOUNTER (OUTPATIENT)
Dept: LAB | Age: 30
End: 2025-01-13
Attending: INTERNAL MEDICINE
Payer: COMMERCIAL

## 2025-01-13 DIAGNOSIS — Z00.00 WELL ADULT EXAM: ICD-10-CM

## 2025-01-13 DIAGNOSIS — E66.01 MORBID OBESITY (HCC): ICD-10-CM

## 2025-01-13 DIAGNOSIS — M06.00 SERONEGATIVE RHEUMATOID ARTHRITIS (HCC): ICD-10-CM

## 2025-01-13 DIAGNOSIS — Z79.899 ENCOUNTER FOR LONG-TERM (CURRENT) USE OF HIGH-RISK MEDICATION: Primary | ICD-10-CM

## 2025-01-13 DIAGNOSIS — M06.4 INFLAMMATORY POLYARTHRITIS (HCC): ICD-10-CM

## 2025-01-13 LAB
ALBUMIN SERPL-MCNC: 3.5 G/DL (ref 3.2–4.8)
ALBUMIN/GLOB SERPL: 1 {RATIO} (ref 1–2)
ALP LIVER SERPL-CCNC: 81 U/L
ALT SERPL-CCNC: 18 U/L
ANION GAP SERPL CALC-SCNC: 4 MMOL/L (ref 0–18)
AST SERPL-CCNC: 14 U/L (ref ?–34)
BASOPHILS # BLD AUTO: 0.04 X10(3) UL (ref 0–0.2)
BASOPHILS NFR BLD AUTO: 0.5 %
BILIRUB SERPL-MCNC: 0.2 MG/DL (ref 0.3–1.2)
BUN BLD-MCNC: 12 MG/DL (ref 9–23)
CALCIUM BLD-MCNC: 8.7 MG/DL (ref 8.7–10.4)
CHLORIDE SERPL-SCNC: 109 MMOL/L (ref 98–112)
CHOLEST SERPL-MCNC: 108 MG/DL (ref ?–200)
CO2 SERPL-SCNC: 26 MMOL/L (ref 21–32)
CREAT BLD-MCNC: 0.7 MG/DL
EGFRCR SERPLBLD CKD-EPI 2021: 121 ML/MIN/1.73M2 (ref 60–?)
EOSINOPHIL # BLD AUTO: 0.11 X10(3) UL (ref 0–0.7)
EOSINOPHIL NFR BLD AUTO: 1.4 %
ERYTHROCYTE [DISTWIDTH] IN BLOOD BY AUTOMATED COUNT: 13.9 %
EST. AVERAGE GLUCOSE BLD GHB EST-MCNC: 111 MG/DL (ref 68–126)
FASTING PATIENT LIPID ANSWER: YES
FASTING STATUS PATIENT QL REPORTED: YES
GLOBULIN PLAS-MCNC: 3.4 G/DL (ref 2–3.5)
GLUCOSE BLD-MCNC: 93 MG/DL (ref 70–99)
HAV IGM SER QL: NONREACTIVE
HBA1C MFR BLD: 5.5 % (ref ?–5.7)
HBV CORE IGM SER QL: NONREACTIVE
HBV SURFACE AG SERPL QL IA: NONREACTIVE
HCT VFR BLD AUTO: 35.1 %
HCV AB SERPL QL IA: NONREACTIVE
HDLC SERPL-MCNC: 35 MG/DL (ref 40–59)
HGB BLD-MCNC: 11.2 G/DL
IMM GRANULOCYTES # BLD AUTO: 0.02 X10(3) UL (ref 0–1)
IMM GRANULOCYTES NFR BLD: 0.3 %
LDLC SERPL CALC-MCNC: 60 MG/DL (ref ?–100)
LYMPHOCYTES # BLD AUTO: 2.85 X10(3) UL (ref 1–4)
LYMPHOCYTES NFR BLD AUTO: 37.5 %
MCH RBC QN AUTO: 25.7 PG (ref 26–34)
MCHC RBC AUTO-ENTMCNC: 31.9 G/DL (ref 31–37)
MCV RBC AUTO: 80.7 FL
MONOCYTES # BLD AUTO: 0.31 X10(3) UL (ref 0.1–1)
MONOCYTES NFR BLD AUTO: 4.1 %
NEUTROPHILS # BLD AUTO: 4.28 X10 (3) UL (ref 1.5–7.7)
NEUTROPHILS # BLD AUTO: 4.28 X10(3) UL (ref 1.5–7.7)
NEUTROPHILS NFR BLD AUTO: 56.2 %
NONHDLC SERPL-MCNC: 73 MG/DL (ref ?–130)
OSMOLALITY SERPL CALC.SUM OF ELEC: 287 MOSM/KG (ref 275–295)
PLATELET # BLD AUTO: 393 10(3)UL (ref 150–450)
POTASSIUM SERPL-SCNC: 3.8 MMOL/L (ref 3.5–5.1)
PROT SERPL-MCNC: 6.9 G/DL (ref 5.7–8.2)
RBC # BLD AUTO: 4.35 X10(6)UL
SODIUM SERPL-SCNC: 139 MMOL/L (ref 136–145)
TRIGL SERPL-MCNC: 59 MG/DL (ref 30–149)
TSI SER-ACNC: 1.27 UIU/ML (ref 0.55–4.78)
VIT D+METAB SERPL-MCNC: 14.5 NG/ML (ref 30–100)
VLDLC SERPL CALC-MCNC: 9 MG/DL (ref 0–30)
WBC # BLD AUTO: 7.6 X10(3) UL (ref 4–11)

## 2025-01-13 PROCEDURE — 80061 LIPID PANEL: CPT

## 2025-01-13 PROCEDURE — 80074 ACUTE HEPATITIS PANEL: CPT

## 2025-01-13 PROCEDURE — 86480 TB TEST CELL IMMUN MEASURE: CPT

## 2025-01-13 PROCEDURE — 85025 COMPLETE CBC W/AUTO DIFF WBC: CPT

## 2025-01-13 PROCEDURE — 84443 ASSAY THYROID STIM HORMONE: CPT

## 2025-01-13 PROCEDURE — 80053 COMPREHEN METABOLIC PANEL: CPT

## 2025-01-13 PROCEDURE — 83036 HEMOGLOBIN GLYCOSYLATED A1C: CPT

## 2025-01-13 PROCEDURE — 82306 VITAMIN D 25 HYDROXY: CPT

## 2025-01-13 PROCEDURE — 36415 COLL VENOUS BLD VENIPUNCTURE: CPT

## 2025-01-15 LAB
M TB IFN-G CD4+ T-CELLS BLD-ACNC: 0.04 IU/ML
M TB TUBERC IFN-G BLD QL: NEGATIVE
M TB TUBERC IGNF/MITOGEN IGNF CONTROL: 8.01 IU/ML
QFT TB1 AG MINUS NIL: 0 IU/ML
QFT TB2 AG MINUS NIL: 0 IU/ML

## 2025-02-07 ENCOUNTER — TELEPHONE (OUTPATIENT)
Dept: FAMILY MEDICINE CLINIC | Facility: CLINIC | Age: 30
End: 2025-02-07

## 2025-02-07 ENCOUNTER — OFFICE VISIT (OUTPATIENT)
Dept: RHEUMATOLOGY | Facility: CLINIC | Age: 30
End: 2025-02-07
Payer: COMMERCIAL

## 2025-02-07 VITALS
SYSTOLIC BLOOD PRESSURE: 128 MMHG | WEIGHT: 293 LBS | HEIGHT: 67 IN | DIASTOLIC BLOOD PRESSURE: 61 MMHG | RESPIRATION RATE: 16 BRPM | HEART RATE: 71 BPM | BODY MASS INDEX: 45.99 KG/M2

## 2025-02-07 DIAGNOSIS — M06.00 SERONEGATIVE RHEUMATOID ARTHRITIS (HCC): Primary | ICD-10-CM

## 2025-02-07 DIAGNOSIS — M06.4 INFLAMMATORY POLYARTHRITIS (HCC): ICD-10-CM

## 2025-02-07 RX ORDER — PREDNISONE 5 MG/1
5 TABLET ORAL DAILY PRN
Qty: 90 TABLET | Refills: 1 | Status: SHIPPED | OUTPATIENT
Start: 2025-02-07

## 2025-02-07 RX ORDER — FOLIC ACID 1 MG/1
1 TABLET ORAL DAILY
Qty: 90 TABLET | Refills: 2 | Status: SHIPPED | OUTPATIENT
Start: 2025-02-07

## 2025-02-07 RX ORDER — METHOTREXATE 2.5 MG/1
TABLET ORAL
Qty: 20 TABLET | Refills: 2 | Status: SHIPPED | OUTPATIENT
Start: 2025-02-07

## 2025-02-07 NOTE — TELEPHONE ENCOUNTER
Patient sent diability claim, physician's statement via [employer]. No fee collected, No RELEASE OF INFORMATION signed.   Forms were received by:___Fax         _X__[employer]         ___Dropped off         ___Mail    From:Patient Pat Leiva-disability claim form        Reason for Form Completion:      [facility] Signed    ___Yes       __X_No    Fee Collected    ___Yes       __X_No    Emailed to self and Forms Department.  Sent inner office mail to Forms Department.

## 2025-02-07 NOTE — PROGRESS NOTES
RHEUMATOLOGY CLINIC- FOLLOW UP     Pat Leiva is a 29 year old female.    ASSESSMENT/PLAN:       ICD-10-CM    1. Seronegative rheumatoid arthritis (HCC): -RF,-CCP, - TETE  M06.00       2. Inflammatory polyarthritis (HCC)  M06.4         DISCUSSION:  Patient presents for f/u of seroneg RA (Prior MRI notable for synovitis consistent with underlying inflammatory arthropathy. RF/CCP/TETE negative).  She does have synovitis on current exam.  We again discussed the risks and benefits of resuming methotrexate to which she is agreeable.Denies family-planning or current pregnancy.    PLAN:  -PRN Prednisone 5 mg q AM   - Methotrexate 2.5 mg tablets x 5 tabs qw with daily folic acid 1 mg. Side effects of MTX include  possible alopecia, hepatotoxicity, mucositis, GI upset, as well as immunosuppressive effects.  Also, emphasized importance of avoiding EtOH while on MTX.  Explained that MTX may take several weeks to months for noticeable symptom improvement. Discussed with patient, given possible immunosuppressive effects of MTX, the importance of keeping vaccinations up-to-date.    - We will monitor labs every 4-6 weeks while on MTX until dosing stabilized.  Then may consider trending labs every 3 months.   - Consult/evaluation communicated with referring physician/provider.    Patient to follow-up in 6-8 weeks with labs drawn prior.    Srini Dyson DO  2/7/2025   3:01 PM      Discussed with patient that they are on chronic treatment with a high-risk medication that requires close lab monitoring for possible drug toxicity.  Additionally, discussed with patient give the possible immunosuppressive effects of their medication as well as their underlying hyper-inflammatory state, the importance of keeping vaccinations and age-appropriate screenings up-to-date, given increased risk of complications and malignancies seen in these patient populations.  Patient to f/u with repeat labs drawn prior (standing labs ordered).  Last TB  testin2025  Last Hepatitis testin2025        SUBJECTIVE :     25 Follow-Up: Patient noting persistent polyarthralgias and joint swelling.  As generalized fatigue.  Right greater than left hand pain.  Taking.  Prednisone 5 mg daily about 2-3 times a week    Current Medications:  Ibuprofen-helpful   PRN Prednisone 5 mg q AM     Medication History:  Corticosteroid-Responsive    IM Depot Medrol 40      Interval History:     2024 Initial Consult: I had the pleasure of seeing Pat Leiva on 2024 as a new outpatient consultation for polyarthralgias. The patient was originally referred by her PCP.     29 year old female w/ PMH PCOS, prediabetes who presents to clinic today.Of note, patient formally following with Dr. Yoder with last appointment 10/17/2017 for suspected seronegative rheumatoid arthritis.  Prior MRI with synovitis affecting her right hand.  During that appointment, patient prescribed a prednisone taper and patient started on methotrexate with folic acid daily.  Plans follow-up in 1 month.    Since giving birth to her daughter about a year and a half ago, has been having worsening polyarthralgias including her knees, ankles, hands, arms.  Sometimes the pain can make it hard for her to walk, with her experiencing falls as if her knees and ankles are \"giving out \".  She works as a mental health  with recent work injury as well.  States that she has been having these arthralgias since misael year of high school.  Has AM stiffness until lunchtime.  Also has a gelling phenomenon with worsening symptoms after rest, such as lying on the couch with her daughter.  She is not family-planning and does not plan to become pregnant again.  Symptoms corticosteroid responsive in the past.  Also notes an occasional itchy rash affecting her hands and knees which resolves with Allegra-D.  No measured unexplained fevers, chills, photosensitive rash, Raynaud's phenomenon,  oral/nasal ulcers, serositis.  Family history notable for a mother with psoriasis, father with gout, distal paternal cousin with RA.  She is unsure whether methotrexate helped in the past.    HISTORY:  Past Medical History:    Allergic rhinitis    Anxiety    Arthritis    Depression    Obesity    PCOS (polycystic ovarian syndrome)    Rheumatoid arthritis (HCC)      Social Hx Reviewed   Family Hx Reviewed     Medications (Active prior to today's visit):  Current Outpatient Medications   Medication Sig Dispense Refill    hydrOXYzine 10 MG Oral Tab Take 1 tablet (10 mg total) by mouth at bedtime. 30 tablet 0    escitalopram 10 MG Oral Tab Take 1 tablet (10 mg total) by mouth every morning. 90 tablet 1    ibuprofen 800 MG Oral Tab Take 1 tablet (800 mg total) by mouth every 8 (eight) hours as needed for Pain. 90 tablet 2       Allergies:  Allergies[1]      ROS:   Review of Systems   Constitutional:  Negative for chills and fever.   HENT:  Negative for congestion, hearing loss, mouth sores, nosebleeds and trouble swallowing.    Eyes:  Negative for photophobia, pain, redness and visual disturbance.   Respiratory:  Negative for cough and shortness of breath.    Cardiovascular:  Negative for chest pain, palpitations and leg swelling.   Gastrointestinal:  Negative for abdominal pain, blood in stool, diarrhea and nausea.   Endocrine: Negative for cold intolerance and heat intolerance.   Genitourinary:  Negative for dysuria, frequency and hematuria.   Musculoskeletal:  Positive for arthralgias, gait problem and joint swelling. Negative for back pain, myalgias, neck pain and neck stiffness.   Skin:  Negative for color change and rash.   Neurological:  Negative for dizziness, weakness, numbness and headaches.   Psychiatric/Behavioral:  Negative for confusion and dysphoric mood.         PHYSICAL EXAM:     Constitutional:  Well developed, Well nourished, No acute distress  HENT:  Normocephalic, Atraumatic, Bilateral external ears  normal, Oropharynx moist, No oral exudates.  Neck: Normal range of motion, No tenderness, Supple, No stridor.  Eyes:  PERRL, EOMI, Conjunctiva normal, No discharge.  Respiratory:  Normal breath sounds, No respiratory distress, No wheezing.  Cardiovascular:  Normal heart rate, Normal rhythm, No murmurs, No rubs, No gallops.  GI:  Bowel sounds normal, Soft, No tenderness, No masses, No pulsatile masses.  : No CVA tenderness.  Musculoskeletal:  A comprehensive 28 count joint exam was done and was negative for swelling or tenderness except as noted. Inspections for misalignment, asymmetry, crepitation, defects, tenderness, masses, nodules, effusions, range of motion, and stability in the upper and lower extremities bilaterally are all normal unless noted.           Integument:  Warm, Dry, No erythema, No rash.  Lymphatic:  No lymphadenopathy noted.  Neurologic:  Alert & oriented x 3, Normal motor function, Normal sensory function, No focal deficits noted.  Psychiatric:  Affect normal, Judgment normal, Mood normal.    LABS:     Prior lab work reviewed and notable for:     2025:  QuantiFERON-TB testing:, Acute hepatitis panel negative  Vitamin D 14.5 (low)  TSH 1.266 WNL    CMP with BUN 12, CR 0.7, LFTs nonelevated, no gamma gap  CBC with normal WBC, Hg 11.2 normocytic,  WNL    2024:  CMP with BUN 16, CR 0.79, LFTs not elevated, no gamma gap  CBC with normal WBC, Hg, PLT    10/29/2024:  TB skin test negative    2018:  ESR 33 (slightly high), CRP 0.79 WNL    2017:  RF less than 5 WNL, CCP 2 WNL    2015:  ESR 38 (high), CRP 1 borderline  SSA/SSB, TETE screen negative  C3 162 WNL, C4 37 WNL  CCP 0.8 WNL, RF less than 3 WNL  ACE 59 WNL  TSH 0.9 WNL    2014:  CK 35 (low)  Imagin2017 right hand XR:  Impression   CONCLUSION: There is intercarpal joint space narrowing with degenerative change in the scaphoid, lunate, capitate, and distal radius, which can be seen with arthritis.   The changes have progressed/developed since prior exams.       5/22/2015 right wrist MRI:  CONCLUSION:   1. Marked synovitis involving the carpus and second through fifth      carpometacarpal joints with associated thickened enhancing synovium,      joint effusions and reactive bone edema. Main differential      considerations include rheumatoid arthritis, less likely gout and      infection.   2. Mild reactive edema in the thenar musculature.   3. Tiny pinhole-sized perforation the midportion of scapholunate ligament.      Volar and dorsal aspects of scapholunate ligament intact.               [1]   Allergies  Allergen Reactions    Gramineae Pollens SHORTNESS OF BREATH    Pollen Extract SHORTNESS OF BREATH    Wheat HIVES, ITCHING and UNKNOWN     Allergy no reaction on file    Wheat Extract SHORTNESS OF BREATH    Naphazoline-Glycerin-Zinc Sulf OTHER (SEE COMMENTS)     Grass    Seasonal      Grass      Wheat Bran      Allergy no reaction on file

## 2025-02-10 NOTE — TELEPHONE ENCOUNTER
/Farrah,    Patient is requesting extension on her disability due to anxiety. Patient is requesting leave until 3/2/25, rtw 3/3/25.     Do you support?    Thank you,  Karina

## 2025-02-10 NOTE — TELEPHONE ENCOUNTER
Patient called to Confirm if additional disability forms were received. Forms received via email. Logged for processing. Patient requesting an extension until 3/2/25, rtw 3/3/25 (letter in chart excuses patient until 3/17/25). Advised patient I would task provider for approval. Patient expressed understanding.

## 2025-02-14 NOTE — TELEPHONE ENCOUNTER
Patient calling to check status on forms, advised patient provider is supporting the extension. Informed patient  will be notified to complete forms. Patient acknowledged.   Patient requesting forms to be sent via [employer] message.

## 2025-02-18 NOTE — PROGRESS NOTES
HISTORY OF PRESENT ILLNESS  Chief Complaint   Patient presents with    Weight Problem     PCP referral, pt is interested in wt loss medication, but not interested in wt lose surgery.       Pat Leiva is a 29 year old female new to our office today for initiation of medical weight loss program, referred by PCP.  Patient presents today with c/o excess weight since puberty.    Reason/goal for weight loss: weight loss and increase exercise making this a routine.    Previous weight loss efforts in the past: exercise    Past 6 months lifestyle interventions: yes, regular exercise    Reviewed [facility] patient contract. Readiness for Lifestyle change: 10/10, Interest in Medication: 10/10, Bariatric surgery interest: 0/10.    Barriers to weight loss: late night eating    Wt Readings from Last 6 Encounters:   02/19/25 (!) 319 lb (144.7 kg)   02/07/25 (!) 317 lb (143.8 kg)   01/29/25 (!) 317 lb (143.8 kg)   01/03/25 (!) 323 lb 12.8 oz (146.9 kg)   12/28/24 (!) 320 lb (145.2 kg)   12/05/24 (!) 327 lb (148.3 kg)          Social hx and lifestyle reviewed:    How many meals do you eat out per week: 4  Who is the primary cook in your home: patient    Please respond to the questions regarding your previous weight loss    How did you hear about the [facility]? Primary Care Provider   Previous weight loss efforts in the past/medication(s): None   Eating behaviors/patterns that have been barriers to weight loss success in the past: Not eating or eating once a day. When i do eat, i over eat.   Please respond to the questions regarding a 24 hour food journal.  Include the average time you ate and the quantity/food preparation method.    List foods, qty and prep for breakfast: I dont eat breakfast.   List foods, qty and prep for lunch. 1 Chicken sandwich with ketchup and a juice drink.   List foods, qty and prep for dinner. Fast food or home cooked. Normally containing a starch, veggie and meat (seafood, chicken or pork)    List foods, qty and prep for snacks. Mussels   List the types and qty of fluids consumed Water, lemonade, ginger ale.   Please respond to the questions regarding lifestyle.    Tobacco use: Yes   Alcohol use: How many servings per week? 2   Supplements taken on a regular basis include: B12   Please respond to the questions regarding exercise/activity    How many days per week are you active or exercise 2   What type of activities: Walking and yoga   Perceived level of exertion on a scale of 1-5, with 5 being very intense: 3   Average stress level on a scale of 1-10, with 10 being extremely stressed: 8- in counseling   How do you cope with stress: Read   Please respond to the questions regarding sleep    How many hours of uninterrupted sleep do you get a night: 2   How many times do you wake up in the night: 3   Do you feel rested in the morning: No   Do you snore: Yes   Do you have sleep apnea: No   Do you use: None     Work:  in the community  Marital status: Boyfriend and 2 y/o daughter  Support: yes    MEDICAL HISTORY  PMH reviewed:   Cardiac disorders: negative  Depression/anxiety: PTSD, anxiety and depression  Glaucoma: negative  Kidney stones: negative  Eating disorder: negative  Migraines: negative  Seizures: negative  Joint-related conditions: RA- ankles, knees, wrists and left elbow  Liver disease: negative  Renal disease: negative  Diabetes: prediabetes  Thyroid disease: negative  Constipation: negative  Other pertinent hx: PCOS  Sleep Apnea hx: negative  Cancer hx: negative  Cholecystectomy and/or gallstones: cholecystectomy  Family or personal history of Pancreatic issues / Medullary Thyroid Cancer/MENS 2: negative  History of bariatric surgery: negative    FMH reviewed: obesity in parent/s or sibling: yes    REVIEW OF SYSTEMS  GENERAL: feels well otherwise, +fatigue  SKIN: denies any rashes to skin folds  HEENT: snoring- yes  LUNGS: denies shortness of breath with exertion  CARDIOVASCULAR:  denies chest pain on exertion, denies palpitations or pedal edema  GI: denies abdominal pain.  No N/V/D/C  MUSCULOSKELETAL: see above  NEURO: denies headaches or dizziness  PSYCH: denies change in behavior or mood, denies feeling sad or depressed. BED screen- negative.    EXAM  /80   Pulse 74   Resp 16   Ht 5' 7\" (1.702 m)   Wt (!) 319 lb (144.7 kg)   LMP 02/13/2025 (Approximate)   SpO2 98%   BMI 49.96 kg/m² ,   Percent body fat: F >32%: 49.6%. VF: 15. Muscle Mass: 10.4%, WC: 57 inches.  GENERAL: well developed, well nourished, in no apparent distress, morbidly obese  SKIN: warm, pink, dry without rashes to exposed area. +acanthosis nigricans to neck  EYES: conjunctiva pink, sclera non icteric, PERRLA  HEENT: atraumatic, normocephalic, O/p: Mallampati score- 2  NECK: supple, non tender, no adenopathy, no thyromegaly  LUNGS: CTA in all fields, breathing non labored  CARDIO: RRR without murmur, normal S1 and S2 without clicks or gallops, no pedal edema. Reviewed EKG in EMR dated 12/30/24  GI: +BS, soft, no masses, HSM or tenderness  MUSCULOSKELETAL: grossly intact  NEURO: Oriented times three  PSYCH: pleasant, cooperative, normal mood and affect    Lab Results   Component Value Date    WBC 7.6 01/13/2025    RBC 4.35 01/13/2025    HGB 11.2 (L) 01/13/2025    HCT 35.1 01/13/2025    MCV 80.7 01/13/2025    MCH 25.7 (L) 01/13/2025    MCHC 31.9 01/13/2025    RDW 13.9 01/13/2025    .0 01/13/2025    MPV 7.9 09/29/2017     Lab Results   Component Value Date    GLU 93 01/13/2025    BUN 12 01/13/2025    BUNCREA 16.3 07/24/2019    CREATSERUM 0.70 01/13/2025    ANIONGAP 4 01/13/2025     01/06/2016    GFRNAA 105 07/24/2019    GFRAA 121 07/24/2019    CA 8.7 01/13/2025    OSMOCALC 287 01/13/2025    ALKPHO 81 01/13/2025    AST 14 01/13/2025    ALT 18 01/13/2025    ALKPHOS 78 05/11/2015    BILT 0.2 (L) 01/13/2025    TP 6.9 01/13/2025    ALB 3.5 01/13/2025    GLOBULIN 3.4 01/13/2025    AGRATIO 0.8 (L)  05/11/2015     01/13/2025    K 3.8 01/13/2025     01/13/2025    CO2 26.0 01/13/2025     Lab Results   Component Value Date     01/13/2025    A1C 5.5 01/13/2025     Lab Results   Component Value Date    CHOLEST 108 01/13/2025    TRIG 59 01/13/2025    HDL 35 (L) 01/13/2025    LDL 60 01/13/2025    VLDL 9 01/13/2025    TCHDLRATIO 2.89 01/06/2016    NONHDLC 73 01/13/2025     Lab Results   Component Value Date    T4F 0.75 06/04/2014    TSH 1.266 01/13/2025     No results found for: \"B12\", \"VITB12\"  Lab Results   Component Value Date    VITD 14.5 (L) 01/13/2025       Medications Ordered Prior to Encounter[1]    ASSESSMENT  Initial Weight Data and Goal Weight Loss:  Weight Calculations  Initial Weight: 319 lbs  Initial Weight Date: 02/19/25  Today's Weight: 319 lbs  5% Goal: 15.95  10% Goal: 31.9  Total Weight Loss: 0 lbs    Diagnoses and all orders for this visit:    Encounter for therapeutic drug monitoring  -     OP REFERRAL TO DIAGNOSTIC SLEEP STUDY  -     Tirzepatide-Weight Management (ZEPBOUND) 2.5 MG/0.5ML Subcutaneous Solution Auto-injector; Inject 2.5 mg into the skin once a week. Dx: Obesity with baseline BMI 51.83  -     Tirzepatide-Weight Management (ZEPBOUND) 5 MG/0.5ML Subcutaneous Solution Auto-injector; Inject 5 mg into the skin once a week. Start after completing full 4 weeks of Zepbound 2.5 mg weekly dose    Class 3 severe obesity with serious comorbidity and body mass index (BMI) of 45.0 to 49.9 in adult, unspecified obesity type (HCC)  - Start Zepbound as directed  - Reviewed balanced plate nutrition with focus on whole food, regular meals daily that include protein and produce and eliminating/reducing late night eating.  - Counseled on the 4 Pillars of health (sleep, stress, nutrition and fitness).  - Reviewed weight synopsis in EMR   - Instructed to use contraception at all times while on AOMs.  -     OP REFERRAL TO DIETITIAN EMG [facility] ([facility] USE ONLY)  -     OP REFERRAL TO DIAGNOSTIC  SLEEP STUDY  -     Tirzepatide-Weight Management (ZEPBOUND) 2.5 MG/0.5ML Subcutaneous Solution Auto-injector; Inject 2.5 mg into the skin once a week. Dx: Obesity with baseline BMI 51.83  -     Tirzepatide-Weight Management (ZEPBOUND) 5 MG/0.5ML Subcutaneous Solution Auto-injector; Inject 5 mg into the skin once a week. Start after completing full 4 weeks of Zepbound 2.5 mg weekly dose    Low level of high density lipoprotein (HDL)  -     OP REFERRAL TO DIETITIAN EMG [facility] ([facility] USE ONLY)  -     OP REFERRAL TO DIAGNOSTIC SLEEP STUDY    Prediabetes  -     OP REFERRAL TO DIETITIAN EMG [facility] ([facility] USE ONLY)  -     OP REFERRAL TO DIAGNOSTIC SLEEP STUDY  -     Tirzepatide-Weight Management (ZEPBOUND) 2.5 MG/0.5ML Subcutaneous Solution Auto-injector; Inject 2.5 mg into the skin once a week. Dx: Obesity with baseline BMI 51.83  -     Tirzepatide-Weight Management (ZEPBOUND) 5 MG/0.5ML Subcutaneous Solution Auto-injector; Inject 5 mg into the skin once a week. Start after completing full 4 weeks of Zepbound 2.5 mg weekly dose    Snoring  -     OP REFERRAL TO DIETITIAN EMG [facility] ([facility] USE ONLY)  -     OP REFERRAL TO DIAGNOSTIC SLEEP STUDY    Chronic fatigue  -     OP REFERRAL TO DIETITIAN EMG [facility] ([facility] USE ONLY)  -     OP REFERRAL TO DIAGNOSTIC SLEEP STUDY    Vitamin D deficiency  -     OP REFERRAL TO DIETITIAN EMG [facility] ([facility] USE ONLY)    Family history of sleep apnea  Comments:  Mom  Orders:  -     OP REFERRAL TO DIAGNOSTIC SLEEP STUDY        PLAN  Medication use and side effects reviewed with patient.  Medication contraindications: none foreseen  Follow up with dietitian and psychologist as recommended.  Discussed the role of sleep and stress in weight management.  Reviewed labs in EMR  Counseled on comprehensive weight loss plan including attention to nutrition, exercise and behavior/stress management for success. See patient instruction below for more details.  Reviewed previous labs in EMR/Care Everywhere  Weight Loss Contract  reviewed and signed.    Patient Instructions   Welcome to the [facility]...your Lifestyle Renovation begins now!  Thank you for placing your trust in our health care team, I look forward to working with you along this journey to better health!    Next steps:     1.  Call our office at [phone] to schedule a personal nutrition consultation with one of our registered dieticians, Alvin Greenfield. Bring along your food journal (3 days minimum). See journal options below.  2.  Body composition completed today with findings of: Total body fat: 49.6% (goal < 32%), Visceral Fat: 15 (goal <10), Muscle mass: 10.4% (goal >18%), and waist circumference 57 inches (goal <35 inches).  4.  Use contraception at all times while on anti-obesity medications.  5.  Complete additional testing as ordered: Sleep study.  6.  Fill your prescribed medication and take as discussed and prescribed: Start Zepbound at 2.5 mg weekly. After 4 weeks increase to the next dose of 5 mg weekly. If at a weight plateau for >3 weeks, then send [facility] message with current scale weight to determine if a dose adjustment is appropriate. Otherwise plan to maintain this dose until next visit. Any further dose titrations beyond this dose will be considered at appointments only, unless otherwise discussed. Visit the website [email or web address] and click on Consumers for additional details, savings, and further dosing instructions. This medication may require a prior authorization (PA) by your insurance. A PA may take one week plus to complete and our office will be in touch during this process if needed. If cost/supply prohibitive plan: contact office. Consider generic alternative to Qsymia (www.qsymia.com) with Phentermine and Topamax.    Tips while taking an injectable medication:    Be an intuitive eater. Listen to your hunger and fullness signals, stopping when you are full.  Consume protein and produce in your day, striving  for a rainbow of color of produce.  Reduce portions to starting size of 1 cup and check in with your gut to see if you are full. Use a sand timer to slow down your eating pace to allow for 15-20 minutes to complete a meal and use the \"2 bite rule\".  Reduce refined sugars and high fat foods, as they may contribute to greater side effects of nausea and heartburn.  Stop eating 3 hours before bedtime to allow your food to digest.  Remain hydrated with water or non caloric and non caffeine beverages.  Use over the counter yeny lozenge/supplement to help reduce nausea if needed.  If you have been off your medication for more than 2 weeks please notify our office to determine next dosing, as a return to previous dose may not be appropriate or tolerated.      Please try to work on the following dietary changes this first month:    1.  Drink water with meals and throughout the day, cut down on soda and/or juice if consumed. Consider flavored water options like Bubbly, Spindrift, Hint and Franck. Reduce alcohol servings to 4 per week maximum.  2.  Have protein with each meal, examples include: greek yogurt, cottage cheese, hard boiled egg, tofu, chicken, fish, or tuna.  3.  Work towards reducing/eliminating refined carbohydrates and sugars which includes items such as sweets, as well as rice, pasta, and bread and make sure to choose whole grain options when having them with just 1 serving per meal about the size of your inner palm.  4.  Consume non starchy veggies daily working towards making them a good 50% of your daily food intake. Add them to lunch and dinner consistently.  5.  Start a daily probiotic: VSL#3 is recommended, (order on line at www.vsl3.com). Take 1 capsule daily with water for 30 days, then reduce to 1 every other day (this will reduce the cost). Capsules can be left out of refrigerator for 2 weeks. I recommend using a pill box weekly and keeping the bottle in the fridge.    Please download edison My  Fitness Pal, LoseIt! Or My Net Diary to monitor daily dietary intake and you will be able to see if you are eating the right amount of calories or too much or too little which would hinder weight loss. Additionally this will help to see your daily carbohydrate and protein intake. When you set the kelli up choose 1.5 lbs/week as a goal.  Keeping a paper food journal is an option as well to remain accountable for your choices- this is the start to mindful eating! A low calorie diet has been consistently shown to support weight loss.    Continue or start exercising to help establish a routine. If not already exercising begin with 1 day/week and progress as able with the goal of working towards 30 minutes 5 days a week at a minimum. A variety or cardio, strength and stretching is important. Review resources below to help support you in building this healthy routine.    Meditation daily can help manage and control stress. Chronic stress can make weight loss difficult.  Exercising is one way to help with stress, but meditation using the CALM Kelli or another comparable alternative can be done in your home or place of work with little time commitment. This Kelli can also help work on behavior change and improve sleep. Check out the segment under Calm Masterclass and listen to The 4 Pillars of Health. A great way to begin learning about the foundation of lifestyle with practical tips to use in your every day. In addition, we offer counseling services and support for individual connection and care. A referral is necessary so please let me know if this is a service you are interested.    Check out www.yourweightmatters.org blog for continued support and education along your weight loss journey to optimal health!      Patient Resources:    Personal Training/Fitness Classes/Health Coaching    [facility] in [place]: Full fitness center with group fitness and personal training located in [place].  Health  Coaching with Magi Starks, Herb Monzon, and Jose Perez at our [facility]- individual coaching to work on your health goals. Call [phone] and/or email @ [email or web address]. Free 60 minute consult when client of [facility].  ANANDA Michelle @ http://www.PhotoRocket. A variety of group fitness options plus various yoga classes [phone] and/or email Daniela at [email or web address]  [facility] Fitness Centers with multiple locations: ReClaims (www.Sportlobster), NearbyNow5 Training (www.Casual Collective), Vital Herd Inc Body BootLifestanderp (www."Mantrii, Inc."), Honk (www.CareFamily), The Exercise  (www.exercisecoach.com), Club Pilates (www.clubAlnara Pharmaceuticals.Altavian)    Online Fitness  Fitness  on Blushr  Fit in 10 DVD series   www.Sail Freight International  Chair exercises via Sit and Be Fit (www.sitandPossibility Space.OzVision) and Gopeers (www.Resource Guru) or Vik Lu or Percy Lee videos on YouTube.  Hip Hop Fit with Chandler Ho at www.hiphopfit.Optio Labs    Apps for on the Go Fitness  Gardner 7 Minute Workout (orange box with white 7) - free on the go HIIT training kelli  Peloton Kelli @ www.onepeloton.com    Nutrition Trackers, Meal Preparation, and Other Meal Programs  LoseIT! And My Fitness Pal apps and on line for tracking nutrition  NOOM - virtual health coaching  FitFoundation (healthy meals on the go) in [place] @ www.ybhtrjznulind8s.[email or web address]  Bistro MD @ www.bistromd.Altavian and Puccbh13 (calorie smart and low carb plans recommended) @ www.rriwfu15.com, Metabolic Meals @ www.MyMetabolicMeals.com - individual prepared meals to go  Gobble, Blue Apron, Home , Every Plate, Sunbasket- on line meal delivery programs for preparation at home  Meal Village in [place] for homemade meals to go @ www.mealvillage.Altavian  Diet Doctor @ www.dietdoctor.com - low carb swaps  ReciMe and Mealime kelli (grocery and meal planning)    Stress, Anxiety, Depression,  Trauma  CALM meditation edison (www.calm.com)  Headspace  Don't let anxiety run your life. Using the science of emotion regulation and mindfulness to overcome fear and worry by Luis Alfredo Espitia PsyD and Oleg Rivas MA.  The Bazaarvoice Podcast (September 27, 2023): 6 Magic Words That Stop Anxiety  What Happened to You?- a look at the impact trauma has on behavior written by Luis Colon and Dr. Gerry Cruz  Whole Again by Chucky Fermin - discovering your true self after trauma    Mindful Eating/The Hungry Brain  Am I Hungry? Mindful eating virtual  edison (www.amihungry.com)  The Hungry Brain by Brandi Zamorano, PhD  Mindless Eating by Adriel Valdez  Weight Loss Surgery Will Not Treat Food Addiction by Opal De Souza Ph.D    Metabolic Dysfunction, Hormones and Cravings  Why We Get Sick? By Fabian Rivera (insulin resistance)  Your Body in Balance: The New Science of Food, Hormones, and Health by Dr. Irvin Watkins  The Complete Guide to fasting by Dr. Galvan  Fast Like a Girl by Dr. Hilda Handy  The M Factor (documentary on PBS about Menopause)  Sugar, Salt & Fat by Thelma Hogan, Ph.D, R.D.  The Truth About Sugar - documentary on sugar (Free on Yurpy, https://youArcot Systemsu.be/5E4qgdiZO5g)  Presentation on SUGAR called Sugar: The Bitter Truth by Dr. Gavin Up (Yurpy) https://youtu.be/dBnniua6-oM?[email or web address]  Reverse Visceral Fat: #1 Way to Increase Your Lifespan & End Inflammation with Dr. Ryder Calderon on Utube @ https://youArcot Systemsu.be/nupPRnvUpJY?[email or web address]    Nutrition Support  You Are What You Eat - Netfix series on twin study looking at impact of nutrition changes on health  The End of Dieting: How to Live for Life by Dr. Bala Melvin M.D. or listen to The WebVisible Podcast Episode 63: Understanding \"Nutritarian\" Eating w/Dr. Bala Melvin  The Game Changers- NetBillingstreet Documentary on plant based nutrition  The Dr. Vickers T5 Wellness Plan by Dr. Timothy Vickers MD  The Complete Guide to fasting  by Dr. Galvan  @[facility] ([facility] Dietician with support surrounding nutrition and meal prep/planning)    Education, Motivation and Support Resources  Live to 100: Secrets of the Blue Zones - Netflix series on the secrets to communities living over 100 years old  Atomic Habits by Sunday Barrera (a book about taking small steps to promote greater behavior change)   Motivation edison (black box with white \")- daily supportive messages sent to your phone  Can't Hurt Me by Luis Alfredo Shook (a book exploring the power of discipline in achieving your goals)  Fed Up - documentary about obesity (Free on Utube)  Www.yourweightmatters.org - Obesity Action Coalition sponsored Blog posts  Obesity Action Coalition Resources on topics specific to weight management (www.obesityaction.org)  Fitlosophy Fitspiration - journal to better health (journal book found at [employer] in fitness aisle)  Bharti Best talk titled: The Call to Courage (Netflix)  The Exam Room by the Physician's Committee (Podcast)  Nutrition Facts by Dr. Huitron (Podcast)      Balanced Nutrition includes:     Build the mentality of Food 4 Fuel. Clean eating with whole foods and eliminating/reducing ultra processed foods.  Be an intuitive eater and using mindful eating practices.  Eat a balanced plate with protein and produce at all meals: 1/4 plate- protein, 1/2 plate non starchy veggies, and 1/4 plate fruit or complex carbohydrate.  Drink water with all meals and use a salad plate to naturally reduce portions.  Eliminate/reduce late night eating by stopping after 7pm. Allowing your body to fast for 12 hours (drink only water, tea or black coffee without any additives).              Eating Out vs. Eating at Home    by Chef Amari Tavarez and Shelly Marques, MS, RD, LD    OAC at www.obesityaction.org Fall 2010 Resource    I haven’t met a person who doesn’t like going out to a restaurant to eat on occasion. If the atmosphere is just right, the food is tasty and the service is great,  the meal is considered perfect. But, is it?    The very first restaurant in the world opened in [place] in 1765. A , Mercedes Laraulanger, served a single dish: sheep’s feet simmered in a white sauce. As for the [place] the [employer] is the oldest restaurant in [place] as well as the oldest restaurant in continuous service. Since 1826, their doors have always been open to diners.    I have had the pleasure of eating at the [employer]. The food was just ok, the service average, but the atmosphere was amazing. In a nutshell, it’s not always the food that makes the restaurant, but the atmosphere or nostalgia.    Restaurants are often looked at as a convenience -- a place to relax and have a good meal. However, I challenge this theory. Think about this: can you go to a restaurant and eat in your underwear and favorite pair of woolly socks? A little ridiculous, but the point is that you’re most comfortable in your own home. In addition, eating at home is more convenient, costs less and above all, it can be a lot healthier.    Serving Sizes  As Americans, we have become accustomed to and expect larger portion sizes from restaurants. “I want my money’s worth,” and “We love coming here because the portion sizes are huge,” are the most common statements I hear when going to a restaurant. Most restaurants serve two to three times more than the healthy portion sizes recommended by the [place] Dietary Guidelines.    Not only is this not healthy, but most people don’t know what a proper portion size is. They tend to overeat and maybe “eat the whole thing.” We have become accustomed to expecting a filled to-go box to take home. You will notice the “made at home” portion sizes in the chart above are smaller and are the recommended serving size. Remember, proper serving sizes mean less calories consumed.    Savor the Flavor  Restaurants are in business to make money, and calorie-counting is not at the top of  their list. Large chain restaurants have corporate  whose sole responsibility is to create mouth-watering, can’t-put-down food. Calories, fat, carbohydrates and many other nutrient values that are recommended are typically lost in the sea of making the tastiest dish with little regard for nutrition.    Two fried chicken patties used as a bun with cheese and nascimento stuffed in the middle is being sold in a major chicken chain. Another chain sells an awesome Asian salad as far as taste goes, but with its toppings and salad dressing, it has more than 800 calories.    At a restaurant, you have almost no control over how most items are prepared, leaving your health and wellness in the hands of the  in the back. At home, you control how much salt is being used, what fat you use to cook with, the quality of the food product and most of all, you’re in control of your health and wellness.    Time Saving  “Eating at a restaurant saves me time,” is far from the truth. The average person does not want to spend more than 20 minutes to prepare a meal for their family. Choose a recipe or food item that requires the amount of time you have to spend in the kitchen.    Plan ahead for days when you have kids’ soccer practice and you know a meal needs to be quick and nutritious (such as chicken Caesar salad). Then, on the days where life gives you more time, plan a pot roast with veggies where the prep time is 15 minutes and the cook time is three hours. As for the restaurant being quicker… if getting in the car and driving to the restaurant, waiting to be seated, waiting to order your food, waiting to get your food, paying for your meal and then driving home is quicker, then you might want to try a different recipe.    Take Responsibility  When all is said and done, you must take responsibility for your own health and wellness. Restaurants provide a great service, but in the end, you need to make decisions based on where you are  in your weight management goals.      Sugar - It’s Not as Sweet as You Think    by Rosa Mcintosh RN, BSN, CBN  OAC Summer 2014 @ [email or web address]/sugar-its-not-[email or web address]-sweet-[email or web address]-you-[email or web address]    According to the [place] Department of Agriculture (USDA), dietary trends from 4565-1034, sugar consumption increased by 19 percent since 1970. Today, the average American consumes 20 teaspoons or 100 pounds of sugar each year! That amounts to 300 calories a day from sugar alone. And to make matters worse (you will read why later in this article), corn syrup consumption is on the rise, increasing by 387 percent in the same period of time. The largest amount of sugar is not being consumed in ingested fruits or other natural sugars. The largest amounts of consumed sugars are in the form of High Fructose Corn Syrup (HFCS) and brown sugar. Neither occurs naturally, and both are highly processed and in nearly every processed package food you will find in your local grocery store. Many of these foods you most likely would not suspect having sugar as an additive (see quiz at bottom of page).    What is sugar?  Sugar is a simple carbohydrate, which can either be a monosaccharide or disaccharide. Monosaccharides include glucose, fructose, and galactose. These three monosaccharides can join together to make the disaccharides maltose, sucrose, and lactose. These compounds are found in the foods we eat and are collectively called “sugar.”    The following are types of sugar and their natural source:    Most people associate the term “sugar” with the white sugar we put in coffee or iced tea. The human body uses glucose, the simplest unit of carbohydrate, as its primary fuel. Without adequate carbohydrate intake, our bodies will obtain glucose, or fuel, from another source. The possibilities include a breakdown of proteins we eat or proteins stored in our body, which may ultimately lead to muscle loss and affecting  one’s metabolic rate or even malnutrition. However, our need is far below the current daily consumption.    Is sugar addictive? You be the .  When high doses of sugar are consumed, it stimulates the release of dopamine in our brains. This response makes us feel pleasure (now you know why when you feel down or depressed you may want to overindulge in sweets). The drug morphine, cocaine and sugar all stimulate the same brain receptors. This study has been proven many times in lab rat studies.    In his new and fascinating book, Salt Sugar Fat: How the Food Giants Hooked Us, Pulitzer Prize-winning  Christian Devi (I highly recommend this book) goes inside the world of processed and packaged foods. Marito writes in detail about how the food industry (which is 17 percent of our economy) contributes to American’s obesity epidemic by infusing processed foods with sugar, salt, and fat to make it more addictive and pleasurable. You see now why so many continue to buy their products?    According to the [facility] Food Policy and Obesity, the average child sees 5,500 food commercials a year that advertise high sugar breakfast cereals, fast food, soft drinks, candy and snacks. According to the Federal Trade Commission, the food industry spends $1.6 billion annually to reach children through the media, including the Internet.    What is high fructose corn syrup?  High fructose corn syrup (HFCS) is an industrial food product and not “natural” or a naturally occurring substance. It is extracted from corn stalks through a secret process. The sugars are extracted through a chemical enzymatic process resulting in HFCS.    Regular cane sugar (sucrose) is made of two-sugar molecules bound tightly together - glucose and fructose in equal amounts. The enzymes in your digestive tract must break down the sucrose into glucose and fructose, which are then absorbed into the body.    HFCS also consists of glucose and  fructose, not in a 50-50 ratio, but a 55-45 fructose to glucose ratio in an unbound form. Fructose is sweeter than glucose. And HCFS is cheaper than sugar. One of the reasons is because of the government farm bill corn subsidies. Products with HFCS are much sweeter and much cheaper than products made with cane sugar.    Sugar and HFCS’ Effect on the Body  Eating sugar has a systemic effect on your entire body including increased risk for diabetes, increased appetite, weight gain, heart and liver problems, decreased immune system, certain cancers and even your brain function to name a few.    Type 2 Diabetes  Type 1 Diabetes is when one’s pancreas does not make insulin. Type 2 Diabetes is when one’s body does not utilize insulin effectively. Type 1 Diabetes is usually diagnosed at a young age. Type 2 Diabetes used to occur in adulthood and was called “Adult Onset Diabetes,” however; it has since been renamed to Type 2 Diabetes because the onset is commonly seen at a much earlier age as the obesity epidemic increases. It has been estimated that just fewer than 2,000,000 individuals were diagnosed with Type 2 diabetes in 2010.    The pancreas acts on ingested sugar by secreting insulin. Insulin is a hormone that regulates the amount of sugar in the blood. If blood sugar gets too high or too low, it could be life-threatening. An increased amount of sugar in one’s diet causes the pancreas to secrete insulin. In some individuals, this leads to an overload on the pancreas and the development of Type 2 diabetes.    Sugar, Appetite & Weight Gain  Eating less sugar is linked with weight-loss, and eating more is linked with weight gain, according to a new review of published studies. The review lends support to the idea that advising people to limit the sugar in their diets may help lessen excess weight and obesity, the researchers conclude. “The really interesting finding is that increasing and decreasing sugar had virtually  identical results (on weight), in the opposite direction of course,” says researcher RYAN Multani, PhD, professor of human nutrition and medicine at the [facility] in [place].    According to leading nutritional expert, Keon Hogan MD, PhD, MPH, chair of nutrition at [employer] and author of Eat, Drink and Be Healthy, “Sugar increases body weight mainly by encouraging overeating.”    Heart and Liver Damage  A study published in the journal Hepatology in late 2012 found that consumption of fructose appears to affect the availability of the energy-transferring chemical ATP in the liver, thereby increasing the risk of liver cell malfunction and death.    In another review of HFCS, The American Journal of Clinical Nutrition, Pedro Nath, PhD, Department of Nutrition, [facility] explains that HFCS is absorbed more rapidly than regular sugar, and that it doesn’t stimulate insulin or leptin production. This prevents you from triggering the body’s signals for being full and may lead to overconsumption of total calories.    A 2012 paper in the journal Nature, brought forward the idea that limitations and warnings should be placed on sugar similar to warnings we see on alcohol. The authors showed evidence that fructose and glucose in excess can have a toxic effect on the liver as the metabolism of ethanol (the alcohol contained in alcoholic beverages) had similarities to the metabolic pathways of fructose.    Another published study in the Journal of Nutrition in 2012, found that children who consumed high levels of fructose had lower blood levels of cardiovascular protective compounds, such as HDL cholesterol and adiponectin. Higher consumption of fructose led to higher levels of fat around the midsection, a significant risk factor for diabetes and cardiovascular disease.    Immune System  Eliminating all sugar from a cancer patient’s diet would harm  healthy cells that need energy to function. For example, many fruits contain high levels of antioxidants which are known to be effective in fighting cancer; however, sugars that come from whole fruits are low in sugar. Plant-based nutrition is a benefit to our overall health including fighting or preventing cancers. These important antioxidants, phytochemicals, fiber, vitamins and minerals are found in these plant-based whole foods.    However, diets high in sugar and refined carbohydrates can lead to overweight and obesity, which indirectly increases cancer risk throughout time. Certain cancers including breast, prostate, colorectal and pancreatic are associated with obesity.    How can you avoid these unhealthy consequences of (often hidden) sugar?  The best way to avoid sugar is to not consume obvious foods that are loaded with sugar. However, as discussed in this article, there are many packaged foods that surprisingly have added sugar and the more health-damaging high fructose corn syrup.  Therefore…    Eat nature’s foods  Avoid processed food (I call these factory foods, not real foods.)  Don’t eat foods in packages (or dramatically decrease consumption)  Eat foods that rot  Eat foods that walked the earth, flew in the a[place], swam in the ocean or grew in the soil     Beware!  Typically, the first ingredient on a label is the most prevalent in the food product; however, beware because there may be small amounts of many types of sugars, so none of them end up being in the first few ingredients (top 3) of the label. Sugar is disguised as a “healthy” ingredient, such as honey, rice syrup, or even “organic dehydrated cane juice.”    Here is a list of some of the possible “sugar” code words:    Corn sweetener  Corn syrup, or corn syrup solids  Dehydrated Cane Juice  Dextrin  Dextrose  Fructose  Fruit juice concentrate  Glucose  High-fructose corn syrup  Honey  Invert sugar  Lactose  Maltodextrin  Malt  syrup  Maltose  Maple syrup  Molasses  Raw sugar  Rice syrup  Saccharose  Sorghum or sorghum syrup  Sucrose  Syrup  Treacle  [place]urbinado sugar  Xylose    High Fructose Corn Sugar (HFCS) Quiz:    Which of the below listed foods contain High Fructose Corn Syrup?    Kraft Macaroni and Cheese  Stove Top Stuffing - Home style Herb  [place] Sun Iced Tea  Ocean Spray Cranberry Juice  Wild Cherry Lifesavers  Robitussan Cough and Congestion  Wonderbread - White Bread  Smuckers Grape Jelly  Iraheta’s Vegetable Soup  Mr & Mrs T Bloody Dione Mix  Nabisco Fig Newtons - Whole Grain  Nabisco Fig Newtons - Fat Free  Wishbone Classic Caesars’ Dressing  Chicken of the Sea White Tuna, [place]    Answer: All    Additional Resources:    The Truth About Sugar - documentary on sugar free on [employer] @ [email or web address]  SUGAR: The Bitter Truth by Dr. Gavin Up (pediatric endocrinologist) - Lecture on sugar for free on  [employer] @ [email or web address]      Return in about 4 months (around 6/19/2025) for weight management via clinic or Telemedicine Visit and in clinic in September.    Patient verbalizes understanding.    Shelley Jaeger, APRN  2/18/2025    DOCUMENTATION OF TIME SPENT: Code selection for this visit was based on time spent : 60 minutes on date of service in preparing to see the patient, obtaining and/or reviewing separately obtained history, performing a medically appropriate examination, counseling and educating the patient/family/caregiver, ordering medications or testing, referring and communicating with other healthcare providers, documenting clinical information in the electronic medical record, independently interpreting results and communicating results to the patient/family/caregiver and care coordination with the patient's other providers.         [1]   Current Outpatient Medications on File Prior to Visit   Medication Sig Dispense Refill    Cyanocobalamin (VITAMIN B 12 OR) Take  by mouth.      predniSONE 5 MG Oral Tab Take 1 tablet (5 mg total) by mouth daily as needed. Take Prednisone 5 mg daily as needed for pain in the morning with food. 90 tablet 1    escitalopram 10 MG Oral Tab Take 1 tablet (10 mg total) by mouth every morning. 90 tablet 1    ibuprofen 800 MG Oral Tab Take 1 tablet (800 mg total) by mouth every 8 (eight) hours as needed for Pain. 90 tablet 2    methotrexate 2.5 MG Oral Tab Take 5 tablets of Methotrexate 2.5 mg once weekly with food. (Patient not taking: Reported on 2/19/2025) 20 tablet 2    folic acid 1 MG Oral Tab Take 1 tablet (1 mg total) by mouth daily. (Patient not taking: Reported on 2/19/2025) 90 tablet 2     No current facility-administered medications on file prior to visit.

## 2025-02-19 ENCOUNTER — OFFICE VISIT (OUTPATIENT)
Dept: INTERNAL MEDICINE CLINIC | Facility: CLINIC | Age: 30
End: 2025-02-19
Payer: COMMERCIAL

## 2025-02-19 VITALS
OXYGEN SATURATION: 98 % | DIASTOLIC BLOOD PRESSURE: 80 MMHG | SYSTOLIC BLOOD PRESSURE: 112 MMHG | HEIGHT: 67 IN | RESPIRATION RATE: 16 BRPM | WEIGHT: 293 LBS | BODY MASS INDEX: 45.99 KG/M2 | HEART RATE: 74 BPM

## 2025-02-19 DIAGNOSIS — R53.82 CHRONIC FATIGUE: ICD-10-CM

## 2025-02-19 DIAGNOSIS — E66.01 CLASS 3 SEVERE OBESITY WITH SERIOUS COMORBIDITY AND BODY MASS INDEX (BMI) OF 45.0 TO 49.9 IN ADULT, UNSPECIFIED OBESITY TYPE (HCC): ICD-10-CM

## 2025-02-19 DIAGNOSIS — E78.6 LOW LEVEL OF HIGH DENSITY LIPOPROTEIN (HDL): ICD-10-CM

## 2025-02-19 DIAGNOSIS — R73.03 PREDIABETES: ICD-10-CM

## 2025-02-19 DIAGNOSIS — R06.83 SNORING: ICD-10-CM

## 2025-02-19 DIAGNOSIS — E55.9 VITAMIN D DEFICIENCY: ICD-10-CM

## 2025-02-19 DIAGNOSIS — Z51.81 ENCOUNTER FOR THERAPEUTIC DRUG MONITORING: Primary | ICD-10-CM

## 2025-02-19 DIAGNOSIS — Z82.0 FAMILY HISTORY OF SLEEP APNEA: ICD-10-CM

## 2025-02-19 DIAGNOSIS — E66.813 CLASS 3 SEVERE OBESITY WITH SERIOUS COMORBIDITY AND BODY MASS INDEX (BMI) OF 45.0 TO 49.9 IN ADULT, UNSPECIFIED OBESITY TYPE (HCC): ICD-10-CM

## 2025-02-19 PROCEDURE — 3079F DIAST BP 80-89 MM HG: CPT | Performed by: NURSE PRACTITIONER

## 2025-02-19 PROCEDURE — 3074F SYST BP LT 130 MM HG: CPT | Performed by: NURSE PRACTITIONER

## 2025-02-19 PROCEDURE — 99205 OFFICE O/P NEW HI 60 MIN: CPT | Performed by: NURSE PRACTITIONER

## 2025-02-19 PROCEDURE — 3008F BODY MASS INDEX DOCD: CPT | Performed by: NURSE PRACTITIONER

## 2025-02-19 RX ORDER — TIRZEPATIDE 2.5 MG/.5ML
2.5 INJECTION, SOLUTION SUBCUTANEOUS WEEKLY
Qty: 2 ML | Refills: 0 | Status: SHIPPED | OUTPATIENT
Start: 2025-02-19 | End: 2025-02-24

## 2025-02-19 RX ORDER — TIRZEPATIDE 5 MG/.5ML
5 INJECTION, SOLUTION SUBCUTANEOUS WEEKLY
Qty: 2 ML | Refills: 3 | Status: SHIPPED | OUTPATIENT
Start: 2025-02-19 | End: 2025-02-24

## 2025-02-19 NOTE — PATIENT INSTRUCTIONS
Welcome to the [facility]...your Lifestyle Renovation begins now!  Thank you for placing your trust in our health care team, I look forward to working with you along this journey to better health!    Next steps:     1.  Call our office at [phone] to schedule a personal nutrition consultation with one of our registered dieticians, Alvin Greenfield. Bring along your food journal (3 days minimum). See journal options below.  2.  Body composition completed today with findings of: Total body fat: 49.6% (goal < 32%), Visceral Fat: 15 (goal <10), Muscle mass: 10.4% (goal >18%), and waist circumference 57 inches (goal <35 inches).  4.  Use contraception at all times while on anti-obesity medications.  5.  Complete additional testing as ordered: Sleep study.  6.  Fill your prescribed medication and take as discussed and prescribed: Start Zepbound at 2.5 mg weekly. After 4 weeks increase to the next dose of 5 mg weekly. If at a weight plateau for >3 weeks, then send Values of n message with current scale weight to determine if a dose adjustment is appropriate. Otherwise plan to maintain this dose until next visit. Any further dose titrations beyond this dose will be considered at appointments only, unless otherwise discussed. Visit the website [email or web address] and click on Consumers for additional details, savings, and further dosing instructions. This medication may require a prior authorization (PA) by your insurance. A PA may take one week plus to complete and our office will be in touch during this process if needed. If cost/supply prohibitive plan: contact office. Consider generic alternative to Qsymia (www.qsymia.com) with Phentermine and Topamax.    Tips while taking an injectable medication:    Be an intuitive eater. Listen to your hunger and fullness signals, stopping when you are full.  Consume protein and produce in your day, striving for a rainbow of color of produce.  Reduce  portions to starting size of 1 cup and check in with your gut to see if you are full. Use a sand timer to slow down your eating pace to allow for 15-20 minutes to complete a meal and use the \"2 bite rule\".  Reduce refined sugars and high fat foods, as they may contribute to greater side effects of nausea and heartburn.  Stop eating 3 hours before bedtime to allow your food to digest.  Remain hydrated with water or non caloric and non caffeine beverages.  Use over the counter yeny lozenge/supplement to help reduce nausea if needed.  If you have been off your medication for more than 2 weeks please notify our office to determine next dosing, as a return to previous dose may not be appropriate or tolerated.      Please try to work on the following dietary changes this first month:    1.  Drink water with meals and throughout the day, cut down on soda and/or juice if consumed. Consider flavored water options like Bubbly, Spindrift, Hint and Franck. Reduce alcohol servings to 4 per week maximum.  2.  Have protein with each meal, examples include: greek yogurt, cottage cheese, hard boiled egg, tofu, chicken, fish, or tuna.  3.  Work towards reducing/eliminating refined carbohydrates and sugars which includes items such as sweets, as well as rice, pasta, and bread and make sure to choose whole grain options when having them with just 1 serving per meal about the size of your inner palm.  4.  Consume non starchy veggies daily working towards making them a good 50% of your daily food intake. Add them to lunch and dinner consistently.  5.  Start a daily probiotic: VSL#3 is recommended, (order on line at www.vsl3.com). Take 1 capsule daily with water for 30 days, then reduce to 1 every other day (this will reduce the cost). Capsules can be left out of refrigerator for 2 weeks. I recommend using a pill box weekly and keeping the bottle in the fridge.    Please download edison My Fitness Pal, LoseIt! Or My Net Diary to monitor  daily dietary intake and you will be able to see if you are eating the right amount of calories or too much or too little which would hinder weight loss. Additionally this will help to see your daily carbohydrate and protein intake. When you set the kelli up choose 1.5 lbs/week as a goal.  Keeping a paper food journal is an option as well to remain accountable for your choices- this is the start to mindful eating! A low calorie diet has been consistently shown to support weight loss.    Continue or start exercising to help establish a routine. If not already exercising begin with 1 day/week and progress as able with the goal of working towards 30 minutes 5 days a week at a minimum. A variety or cardio, strength and stretching is important. Review resources below to help support you in building this healthy routine.    Meditation daily can help manage and control stress. Chronic stress can make weight loss difficult.  Exercising is one way to help with stress, but meditation using the CALM Kelli or another comparable alternative can be done in your home or place of work with little time commitment. This Kelli can also help work on behavior change and improve sleep. Check out the segment under Calm Masterclass and listen to The 4 Pillars of Health. A great way to begin learning about the foundation of lifestyle with practical tips to use in your every day. In addition, we offer counseling services and support for individual connection and care. A referral is necessary so please let me know if this is a service you are interested.    Check out www.yourweightmatters.org blog for continued support and education along your weight loss journey to optimal health!      Patient Resources:    Personal Training/Fitness Classes/Health Coaching    [facility] in [place]: Full fitness center with group fitness and personal training located in [place].  Health Coaching with Magi Starks, Herb Monzon, and Jose Perez  at our [facility]- individual coaching to work on your health goals. Call [phone] and/or email @ [email or web address]. Free 60 minute consult when client of [facility].  ANANDA Michelle @ [email or web address]. A variety of group fitness options plus various yoga classes [phone] and/or email Daniela at [email or web address]  [facility] with multiple locations: [employer] (www.EpicForce), F45 Training (www.r19hpotszpynxtControl), Joberator Body Bootcamp (www.SupportPayp.ShoutOut), Alchemia Oncology (www.Orthohub), The Exercise  (www.exercisecoach.com), Club Pilates (www.clubStarMobile.ShoutOut)    Online Fitness  Fitness  on HALGI  Fit in 10 DVD series   www.Zawatt  Chair exercises via Sit and Be Fit (www.sitandVidedressing.org) and Probity (www.Videregen) or Vik Lu or Percy Lee videos on YouAzumioube.  Hip Hop Fit with EnSol at www.hiphopAlignable.[employer]    Apps for on the Go Fitness  Netatmo 7 Minute Workout (orange box with white 7) - free on the go HIIT training kelli  Peloton Kelli @ www.onepeloton.com    Nutrition Trackers, Meal Preparation, and Other Meal Programs  LoseIT! And My Fitness Pal apps and on line for tracking nutrition  NOOM - virtual health coaching  FitFoundation (healthy meals on the go) in [place] @ www.[email or web address].ShoutOut  Pawan DAVIDSON @ www.bistromd.ShoutOut and Vrdyfs62 (calorie smart and low carb plans recommended) @ www.kwfcgg02.com, Metabolic Meals @ www.MyMetabolicMeals.com - individual prepared meals to go  Gobble, Blue Apron, Home , Every Plate, Sunbasket- on line meal delivery programs for preparation at home  Meal Village in [place] for homemade meals to go @ www.mealvillage.com  Diet Doctor @ www.dietdoctor.com - low carb swaps  ReciMe and Mealime kelli (grocery and meal planning)    Stress, Anxiety, Depression, Trauma  CALM meditation kelli (www.calm.com)  Headspace  Don't  let anxiety run your life. Using the science of emotion regulation and mindfulness to overcome fear and worry by Luis Alfredo Espitia PsyD and Oleg Rivas MA.  The Buzzoola Podcast (September 27, 2023): 6 Magic Words That Stop Anxiety  What Happened to You?- a look at the impact trauma has on behavior written by Luis Colon and Dr. Gerry Cruz  Whole Again by Chucky Fermin - discovering your true self after trauma    Mindful Eating/The Hungry Brain  Am I Hungry? Mindful eating virtual  edison (www.amihungry.com)  The Hungry Brain by Brandi Zamorano, PhD  Mindless Eating by Adriel Valdez  Weight Loss Surgery Will Not Treat Food Addiction by Opal De Souza Ph.D    Metabolic Dysfunction, Hormones and Cravings  Why We Get Sick? By Fabian Rivera (insulin resistance)  Your Body in Balance: The New Science of Food, Hormones, and Health by Dr. Irvin Watkins  The Complete Guide to fasting by Dr. Galvan  Fast Like a Girl by Dr. Hilda Handy  The M Factor (documentary on PBS about Menopause)  Sugar, Salt & Fat by Thelma Hogan, Ph.D, R.D.  The Truth About Sugar - documentary on sugar (Free on [employer], https://youAtterou.[email or web address])  Presentation on SUGAR called Sugar: The Bitter Truth by Dr. Gavin Up ([employer]) [email or web address]?[email or web address]  Reverse Visceral Fat: #1 Way to Increase Your Lifespan & End Inflammation with Dr. Ryder Calderon on Utube @ https://youAtterou.be/[email or web address]    Nutrition Support  You Are What You Eat - Netfix series on twin study looking at impact of nutrition changes on health  The End of Dieting: How to Live for Life by Dr. Bala Melvin M.D. or listen to The Habbo Podcast Episode 63: Understanding \"Nutritarian\" Eating w/Dr. Bala Melvin  The Game Changers- Netflix Documentary on plant based nutrition  The Dr. Vickers T5 Wellness Plan by Dr. Timothy Vickers MD  The Complete Guide to fasting by Dr. Galvan  @[facility] ([facility] Dietician with support  surrounding nutrition and meal prep/planning)    Education, Motivation and Support Resources  Live to 100: Secrets of the Blue Zones - Netflix series on the secrets to communities living over 100 years old  Atomic Habits by Sunday Barrera (a book about taking small steps to promote greater behavior change)   Motivation edison (black box with white \")- daily supportive messages sent to your phone  Can't Hurt Me by Luis Alfredo Shook (a book exploring the power of discipline in achieving your goals)  Fed Up - documentary about obesity (Free on Utube)  Www.yourweightmatters.org - Obesity Action Coalition sponsored Blog posts  Obesity Action Coalition Resources on topics specific to weight management (www.obesityaction.org)  Fitlosophy Fitspiration - journal to better health (journal book found at [employer] in fitness aisle)  Bharti Best talk titled: The Call to Courage (Netflix)  The Exam Room by the Physician's Committee (Podcast)  Nutrition Facts by Dr. Huitron (Podcast)      Balanced Nutrition includes:     Build the mentality of Food 4 Fuel. Clean eating with whole foods and eliminating/reducing ultra processed foods.  Be an intuitive eater and using mindful eating practices.  Eat a balanced plate with protein and produce at all meals: 1/4 plate- protein, 1/2 plate non starchy veggies, and 1/4 plate fruit or complex carbohydrate.  Drink water with all meals and use a salad plate to naturally reduce portions.  Eliminate/reduce late night eating by stopping after 7pm. Allowing your body to fast for 12 hours (drink only water, tea or black coffee without any additives).              Eating Out vs. Eating at Home    by Chef Amari Tavarez and Shelly Marques, MS, RD, LD    OAC at www.obesityaction.org Fall 2010 Resource    I haven’t met a person who doesn’t like going out to a restaurant to eat on occasion. If the atmosphere is just right, the food is tasty and the service is great, the meal is considered perfect. But, is it?    The very  first restaurant in the world opened in [place] in 1765. A , Mercedes Lino, served a single dish: sheep’s feet simmered in a white sauce. As for the [place] the [employer] is the oldest restaurant in [place] as well as the oldest restaurant in continuous service. Since 1826, their doors have always been open to diners.    I have had the pleasure of eating at the [employer]. The food was just ok, the service average, but the atmosphere was amazing. In a nutshell, it’s not always the food that makes the restaurant, but the atmosphere or nostalgia.    Restaurants are often looked at as a convenience -- a place to relax and have a good meal. However, I challenge this theory. Think about this: can you go to a restaurant and eat in your underwear and favorite pair of woolly socks? A little ridiculous, but the point is that you’re most comfortable in your own home. In addition, eating at home is more convenient, costs less and above all, it can be a lot healthier.    Serving Sizes  As Americans, we have become accustomed to and expect larger portion sizes from restaurants. “I want my money’s worth,” and “We love coming here because the portion sizes are huge,” are the most common statements I hear when going to a restaurant. Most restaurants serve two to three times more than the healthy portion sizes recommended by the [place] Dietary Guidelines.    Not only is this not healthy, but most people don’t know what a proper portion size is. They tend to overeat and maybe “eat the whole thing.” We have become accustomed to expecting a filled to-go box to take home. You will notice the “made at home” portion sizes in the chart above are smaller and are the recommended serving size. Remember, proper serving sizes mean less calories consumed.    Savor the Flavor  Restaurants are in business to make money, and calorie-counting is not at the top of their list. Large chain restaurants have corporate   whose sole responsibility is to create mouth-watering, can’t-put-down food. Calories, fat, carbohydrates and many other nutrient values that are recommended are typically lost in the sea of making the tastiest dish with little regard for nutrition.    Two fried chicken patties used as a bun with cheese and nascimento stuffed in the middle is being sold in a major chicken chain. Another chain sells an awesome Asian salad as far as taste goes, but with its toppings and salad dressing, it has more than 800 calories.    At a restaurant, you have almost no control over how most items are prepared, leaving your health and wellness in the hands of the  in the back. At home, you control how much salt is being used, what fat you use to cook with, the quality of the food product and most of all, you’re in control of your health and wellness.    Time Saving  “Eating at a restaurant saves me time,” is far from the truth. The average person does not want to spend more than 20 minutes to prepare a meal for their family. Choose a recipe or food item that requires the amount of time you have to spend in the kitchen.    Plan ahead for days when you have kids’ soccer practice and you know a meal needs to be quick and nutritious (such as chicken Caesar salad). Then, on the days where life gives you more time, plan a pot roast with veggies where the prep time is 15 minutes and the cook time is three hours. As for the restaurant being quicker… if getting in the car and driving to the restaurant, waiting to be seated, waiting to order your food, waiting to get your food, paying for your meal and then driving home is quicker, then you might want to try a different recipe.    Take Responsibility  When all is said and done, you must take responsibility for your own health and wellness. Restaurants provide a great service, but in the end, you need to make decisions based on where you are in your weight management goals.      Sugar - It’s Not  as Sweet as You Think    by Rosa Mcintosh RN, BSN, CBN  OAC Summer 2014 @ https://www.obesityaction.org/resources/sugar-its-not-as-sweet-as-you-think/    According to the United [place] Department of Agriculture (USDA), dietary trends from 5894-2232, sugar consumption increased by 19 percent since 1970. Today, the average American consumes 20 teaspoons or 100 pounds of sugar each year! That amounts to 300 calories a day from sugar alone. And to make matters worse (you will read why later in this article), corn syrup consumption is on the rise, increasing by 387 percent in the same period of time. The largest amount of sugar is not being consumed in ingested fruits or other natural sugars. The largest amounts of consumed sugars are in the form of High Fructose Corn Syrup (HFCS) and brown sugar. Neither occurs naturally, and both are highly processed and in nearly every processed package food you will find in your local grocery store. Many of these foods you most likely would not suspect having sugar as an additive (see quiz at bottom of page).    What is sugar?  Sugar is a simple carbohydrate, which can either be a monosaccharide or disaccharide. Monosaccharides include glucose, fructose, and galactose. These three monosaccharides can join together to make the disaccharides maltose, sucrose, and lactose. These compounds are found in the foods we eat and are collectively called “sugar.”    The following are types of sugar and their natural source:    Most people associate the term “sugar” with the white sugar we put in coffee or iced tea. The human body uses glucose, the simplest unit of carbohydrate, as its primary fuel. Without adequate carbohydrate intake, our bodies will obtain glucose, or fuel, from another source. The possibilities include a breakdown of proteins we eat or proteins stored in our body, which may ultimately lead to muscle loss and affecting one’s metabolic rate or even malnutrition. However, our  need is far below the current daily consumption.    Is sugar addictive? You be the .  When high doses of sugar are consumed, it stimulates the release of dopamine in our brains. This response makes us feel pleasure (now you know why when you feel down or depressed you may want to overindulge in sweets). The drug morphine, cocaine and sugar all stimulate the same brain receptors. This study has been proven many times in lab rat studies.    In his new and fascinating book, Salt Sugar Fat: How the Food Giants Hooked Us, Pulitzer Prize-winning  Christian Devi (I highly recommend this book) goes inside the world of processed and packaged foods. Marito writes in detail about how the food industry (which is 17 percent of our economy) contributes to American’s obesity epidemic by infusing processed foods with sugar, salt, and fat to make it more addictive and pleasurable. You see now why so many continue to buy their products?    According to the [facility] for Food Policy and Obesity, the average child sees 5,500 food commercials a year that advertise high sugar breakfast cereals, fast food, soft drinks, candy and snacks. According to the Federal Trade Commission, the food industry spends $1.6 billion annually to reach children through the media, including the Internet.    What is high fructose corn syrup?  High fructose corn syrup (HFCS) is an industrial food product and not “natural” or a naturally occurring substance. It is extracted from corn stalks through a secret process. The sugars are extracted through a chemical enzymatic process resulting in HFCS.    Regular cane sugar (sucrose) is made of two-sugar molecules bound tightly together - glucose and fructose in equal amounts. The enzymes in your digestive tract must break down the sucrose into glucose and fructose, which are then absorbed into the body.    HFCS also consists of glucose and fructose, not in a 50-50 ratio, but a 55-45 fructose to  glucose ratio in an unbound form. Fructose is sweeter than glucose. And HCFS is cheaper than sugar. One of the reasons is because of the government farm bill corn subsidies. Products with HFCS are much sweeter and much cheaper than products made with cane sugar.    Sugar and HFCS’ Effect on the Body  Eating sugar has a systemic effect on your entire body including increased risk for diabetes, increased appetite, weight gain, heart and liver problems, decreased immune system, certain cancers and even your brain function to name a few.    Type 2 Diabetes  Type 1 Diabetes is when one’s pancreas does not make insulin. Type 2 Diabetes is when one’s body does not utilize insulin effectively. Type 1 Diabetes is usually diagnosed at a young age. Type 2 Diabetes used to occur in adulthood and was called “Adult Onset Diabetes,” however; it has since been renamed to Type 2 Diabetes because the onset is commonly seen at a much earlier age as the obesity epidemic increases. It has been estimated that just fewer than 2,000,000 individuals were diagnosed with Type 2 diabetes in 2010.    The pancreas acts on ingested sugar by secreting insulin. Insulin is a hormone that regulates the amount of sugar in the blood. If blood sugar gets too high or too low, it could be life-threatening. An increased amount of sugar in one’s diet causes the pancreas to secrete insulin. In some individuals, this leads to an overload on the pancreas and the development of Type 2 diabetes.    Sugar, Appetite & Weight Gain  Eating less sugar is linked with weight-loss, and eating more is linked with weight gain, according to a new review of published studies. The review lends support to the idea that advising people to limit the sugar in their diets may help lessen excess weight and obesity, the researchers conclude. “The really interesting finding is that increasing and decreasing sugar had virtually identical results (on weight), in the opposite direction  of course,” says researcher RYAN Multani, PhD, professor of human nutrition and medicine at the [facility] in [facility].    According to leading nutritional expert, Keon Hogan MD, PhD, MPH, chair of nutrition at [employer] and author of Eat, Drink and Be Healthy, “Sugar increases body weight mainly by encouraging overeating.”    Heart and Liver Damage  A study published in the journal Hepatology in late 2012 found that consumption of fructose appears to affect the availability of the energy-transferring chemical ATP in the liver, thereby increasing the risk of liver cell malfunction and death.    In another review of HFCS, The American Journal of Clinical Nutrition, Pedro Nath, PhD, Department of Nutrition, [facility] explains that HFCS is absorbed more rapidly than regular sugar, and that it doesn’t stimulate insulin or leptin production. This prevents you from triggering the body’s signals for being full and may lead to overconsumption of total calories.    A 2012 paper in the journal Nature, brought forward the idea that limitations and warnings should be placed on sugar similar to warnings we see on alcohol. The authors showed evidence that fructose and glucose in excess can have a toxic effect on the liver as the metabolism of ethanol (the alcohol contained in alcoholic beverages) had similarities to the metabolic pathways of fructose.    Another published study in the Journal of Nutrition in 2012, found that children who consumed high levels of fructose had lower blood levels of cardiovascular protective compounds, such as HDL cholesterol and adiponectin. Higher consumption of fructose led to higher levels of fat around the midsection, a significant risk factor for diabetes and cardiovascular disease.    Immune System  Eliminating all sugar from a cancer patient’s diet would harm healthy cells that need energy to function. For example, many  fruits contain high levels of antioxidants which are known to be effective in fighting cancer; however, sugars that come from whole fruits are low in sugar. Plant-based nutrition is a benefit to our overall health including fighting or preventing cancers. These important antioxidants, phytochemicals, fiber, vitamins and minerals are found in these plant-based whole foods.    However, diets high in sugar and refined carbohydrates can lead to overweight and obesity, which indirectly increases cancer risk throughout time. Certain cancers including breast, prostate, colorectal and pancreatic are associated with obesity.    How can you avoid these unhealthy consequences of (often hidden) sugar?  The best way to avoid sugar is to not consume obvious foods that are loaded with sugar. However, as discussed in this article, there are many packaged foods that surprisingly have added sugar and the more health-damaging high fructose corn syrup.  Therefore…    Eat nature’s foods  Avoid processed food (I call these factory foods, not real foods.)  Don’t eat foods in packages (or dramatically decrease consumption)  Eat foods that rot  Eat foods that walked the earth, flew in the a[place]us, swam in the ocean or grew in the soil     Beware!  Typically, the first ingredient on a label is the most prevalent in the food product; however, beware because there may be small amounts of many types of sugars, so none of them end up being in the first few ingredients (top 3) of the label. Sugar is disguised as a “healthy” ingredient, such as honey, rice syrup, or even “organic dehydrated cane juice.”    Here is a list of some of the possible “sugar” code words:    Corn sweetener  Corn syrup, or corn syrup solids  Dehydrated Cane Juice  Dextrin  Dextrose  Fructose  Fruit juice concentrate  Glucose  High-fructose corn syrup  Honey  Invert sugar  Lactose  Maltodextrin  Malt syrup  Maltose  Maple syrup  Molasses  Raw sugar  Rice  syrup  Saccharose  Sorghum or sorghum syrup  Sucrose  Syrup  Treacle  [place]inado sugar  Xylose    High Fructose Corn Sugar (HFCS) Quiz:    Which of the below listed foods contain High Fructose Corn Syrup?    Kraft Macaroni and Cheese  Stove Top Stuffing - Home style Herb  Cheyenne Sun Iced Tea  Ocean Spray Cranberry Juice  Wild Cherry Lifesavers  Robitussan Cough and Congestion  Wonderbread - White Bread  Smuckers Grape Jelly  Iraheta’s Vegetable Soup  Mr & Mrs T Bloody Dione Mix  Nabisco Fig Newtons - Whole Grain  Nabisco Fig Newtons - Fat Free  Wishbone Classic Caesars’ Dressing  Chicken of the Sea White Tuna, Spring Water    Answer: All    Additional Resources:    The Truth About Sugar - documentary on sugar free on [employer] @ [email or web address]  SUGAR: The Bitter Truth by Dr. Gavin Up (pediatric endocrinologist) - Lecture on sugar for free on  [employer] @ [email or web address]

## 2025-02-19 NOTE — TELEPHONE ENCOUNTER
Dr. Tucker,       Please sign off on form if you agree to: Disability 11/30/2024-03/02/2025 w/ Return to work 03/03/2025  (place your signature on the first page only)    -From your Inbasket, Highlight the patient and click Chart   -Double click the 02/07/2025 Forms Completion telephone encounter  -Scroll down to the Media section   -Click the blue Hyperlink: disability Dr. Alexa Tucker 02/19/2025  -Click Acknowledge located in the top right ribbon/menu   -Drag the mouse into the blank space of the document and a + sign will appear. Left click to   electronically sign the document.     Thank you,  Adri SEVILLA

## 2025-02-22 ENCOUNTER — PATIENT MESSAGE (OUTPATIENT)
Dept: INTERNAL MEDICINE CLINIC | Facility: CLINIC | Age: 30
End: 2025-02-22

## 2025-02-22 DIAGNOSIS — E66.813 CLASS 3 SEVERE OBESITY WITH SERIOUS COMORBIDITY AND BODY MASS INDEX (BMI) OF 45.0 TO 49.9 IN ADULT, UNSPECIFIED OBESITY TYPE (HCC): ICD-10-CM

## 2025-02-22 DIAGNOSIS — E66.01 CLASS 3 SEVERE OBESITY WITH SERIOUS COMORBIDITY AND BODY MASS INDEX (BMI) OF 45.0 TO 49.9 IN ADULT, UNSPECIFIED OBESITY TYPE (HCC): ICD-10-CM

## 2025-02-22 DIAGNOSIS — Z51.81 ENCOUNTER FOR THERAPEUTIC DRUG MONITORING: Primary | ICD-10-CM

## 2025-02-24 RX ORDER — PHENTERMINE HYDROCHLORIDE 15 MG/1
15 CAPSULE ORAL EVERY MORNING
Qty: 30 CAPSULE | Refills: 3 | Status: SHIPPED | OUTPATIENT
Start: 2025-02-24

## 2025-02-24 RX ORDER — TOPIRAMATE 25 MG/1
25 TABLET, FILM COATED ORAL 2 TIMES DAILY
Qty: 60 TABLET | Refills: 3 | Status: SHIPPED | OUTPATIENT
Start: 2025-02-24

## 2025-02-24 NOTE — TELEPHONE ENCOUNTER
Future Appointments   Date Time Provider Department Center   2/26/2025  8:30 AM Alexa Tucker, DO [facility] [address]   3/22/2025  9:15 PM SCHEDULE BY DATE [facility]LP [facility]   4/11/2025  1:50 PM Srini Dyson DO [facility]   6/24/2025 10:20 AM Shelley Jaeger APRN [facility] [address]   9/4/2025 11:00 AM Shelley Jaeger APRN [facility] [address]   12/10/2025 10:00 AM Shelley Jaeger APRN [facility] [address]

## 2025-02-24 NOTE — TELEPHONE ENCOUNTER
Seen 2/19/25  Zepbound is not covered in plan.  Asking for the alternative    This medication may require a prior authorization (PA) by your insurance. A PA may take one week plus to complete and our office will be in touch during this process if needed. If cost/supply prohibitive plan: contact office. Consider generic alternative to Qsymia (www.qsymia.com) with Phentermine and Topamax.

## 2025-02-24 NOTE — TELEPHONE ENCOUNTER
She will need to schedule an appointment with me to reassess her anxiety and determine If she more time off  is necessary

## 2025-02-26 ENCOUNTER — PATIENT MESSAGE (OUTPATIENT)
Dept: FAMILY MEDICINE CLINIC | Facility: CLINIC | Age: 30
End: 2025-02-26

## 2025-02-26 ENCOUNTER — TELEPHONE (OUTPATIENT)
Dept: FAMILY MEDICINE CLINIC | Facility: CLINIC | Age: 30
End: 2025-02-26

## 2025-02-26 NOTE — TELEPHONE ENCOUNTER
Patient calling stating that MD filled out from for her today but forms department states that she did not sign it. Patient needs form to be taken care of asap bc she has been waiting for a few weeks on this.

## 2025-02-26 NOTE — TELEPHONE ENCOUNTER
It was signed, now the signature disappeared, the signature tab is missing, rerouted to forms department to fix this. I have messaged the patient that we hopefully resolve this by tomorrow.

## 2025-03-03 ENCOUNTER — TELEPHONE (OUTPATIENT)
Dept: FAMILY MEDICINE CLINIC | Facility: CLINIC | Age: 30
End: 2025-03-03

## 2025-03-03 NOTE — TELEPHONE ENCOUNTER
Patient returned to work today, Adriel had wrote her a note until mid-March but after discussion, she went back today and needs a note stating such. Please put on her MyChart and she will retrieve it there, asap if possible.

## 2025-03-22 ENCOUNTER — OFFICE VISIT (OUTPATIENT)
Dept: SLEEP CENTER | Age: 30
End: 2025-03-22
Attending: NURSE PRACTITIONER
Payer: COMMERCIAL

## 2025-03-22 DIAGNOSIS — E78.6 LOW LEVEL OF HIGH DENSITY LIPOPROTEIN (HDL): ICD-10-CM

## 2025-03-22 DIAGNOSIS — R53.82 CHRONIC FATIGUE: ICD-10-CM

## 2025-03-22 DIAGNOSIS — Z51.81 ENCOUNTER FOR THERAPEUTIC DRUG MONITORING: Primary | ICD-10-CM

## 2025-03-22 DIAGNOSIS — E66.01 CLASS 3 SEVERE OBESITY WITH SERIOUS COMORBIDITY AND BODY MASS INDEX (BMI) OF 45.0 TO 49.9 IN ADULT, UNSPECIFIED OBESITY TYPE (HCC): ICD-10-CM

## 2025-03-22 DIAGNOSIS — R73.03 PREDIABETES: ICD-10-CM

## 2025-03-22 DIAGNOSIS — R06.83 SNORING: ICD-10-CM

## 2025-03-22 DIAGNOSIS — E66.813 CLASS 3 SEVERE OBESITY WITH SERIOUS COMORBIDITY AND BODY MASS INDEX (BMI) OF 45.0 TO 49.9 IN ADULT, UNSPECIFIED OBESITY TYPE (HCC): ICD-10-CM

## 2025-03-22 DIAGNOSIS — Z82.0 FAMILY HISTORY OF SLEEP APNEA: ICD-10-CM

## 2025-03-22 PROCEDURE — 95810 POLYSOM 6/> YRS 4/> PARAM: CPT

## 2025-03-28 ENCOUNTER — SLEEP STUDY (OUTPATIENT)
Facility: CLINIC | Age: 30
End: 2025-03-28
Payer: COMMERCIAL

## 2025-03-28 DIAGNOSIS — G47.30 SLEEP APNEA, UNSPECIFIED TYPE: Primary | ICD-10-CM

## 2025-03-28 PROCEDURE — 95810 POLYSOM 6/> YRS 4/> PARAM: CPT | Performed by: INTERNAL MEDICINE

## 2025-04-06 ENCOUNTER — PATIENT MESSAGE (OUTPATIENT)
Dept: INTERNAL MEDICINE CLINIC | Facility: CLINIC | Age: 30
End: 2025-04-06

## 2025-04-06 DIAGNOSIS — G47.33 OSA (OBSTRUCTIVE SLEEP APNEA): ICD-10-CM

## 2025-04-06 DIAGNOSIS — Z51.81 ENCOUNTER FOR THERAPEUTIC DRUG MONITORING: Primary | ICD-10-CM

## 2025-04-07 RX ORDER — IBUPROFEN 800 MG/1
800 TABLET, FILM COATED ORAL EVERY 8 HOURS PRN
Qty: 90 TABLET | Refills: 2 | Status: SHIPPED | OUTPATIENT
Start: 2025-04-07

## 2025-04-07 RX ORDER — TIRZEPATIDE 2.5 MG/.5ML
2.5 INJECTION, SOLUTION SUBCUTANEOUS WEEKLY
Qty: 2 ML | Refills: 0 | Status: SHIPPED | OUTPATIENT
Start: 2025-04-07 | End: 2025-04-11

## 2025-04-07 NOTE — TELEPHONE ENCOUNTER
Requested Prescriptions     Pending Prescriptions Disp Refills    ibuprofen 800 MG Oral Tab 90 tablet 2     Sig: Take 1 tablet (800 mg total) by mouth every 8 (eight) hours as needed for Pain.     LOV: 2/7/25  Future Appointments   Date Time Provider Department Center   4/11/2025  1:50 PM Srini Dyson DO [facility]   6/2/2025  2:30 PM Joni Grier MD E[facility] Pulm [facility] [place]   6/24/2025 10:20 AM Shelley Jaeger APRN [facility] [address]   9/4/2025 11:00 AM Shelley Jaeger APRN [facility] [address]   12/10/2025 10:00 AM Shelley Jaeger APRN [facility] [address]     Labs:   Component      Latest Ref Rng 1/13/2025   WBC      4.0 - 11.0 x10(3) uL 7.6    RBC      3.80 - 5.30 x10(6)uL 4.35    Hemoglobin      12.0 - 16.0 g/dL 11.2 (L)    Hematocrit      35.0 - 48.0 % 35.1    Platelet Count      150.0 - 450.0 10(3)uL 393.0    MCV      80.0 - 100.0 fL 80.7    MCH      26.0 - 34.0 pg 25.7 (L)    MCHC      31.0 - 37.0 g/dL 31.9    RDW      % 13.9    Prelim Neutrophil Abs      1.50 - 7.70 x10 (3) uL 4.28    Neutrophils Absolute      1.50 - 7.70 x10(3) uL 4.28    Lymphocytes Absolute      1.00 - 4.00 x10(3) uL 2.85    Monocytes Absolute      0.10 - 1.00 x10(3) uL 0.31    Eosinophils Absolute      0.00 - 0.70 x10(3) uL 0.11    Basophils Absolute      0.00 - 0.20 x10(3) uL 0.04    Immature Granulocyte Absolute      0.00 - 1.00 x10(3) uL 0.02    Neutrophils %      % 56.2    Lymphocytes %      % 37.5    Monocytes %      % 4.1    Eosinophils %      % 1.4    Basophils %      % 0.5    Immature Granulocyte %      % 0.3    Glucose      70 - 99 mg/dL 93    Sodium      136 - 145 mmol/L 139    Potassium      3.5 - 5.1 mmol/L 3.8    Chloride      98 - 112 mmol/L 109    Carbon Dioxide, Total      21.0 - 32.0 mmol/L 26.0    ANION GAP      0 - 18 mmol/L 4    BUN      9 - 23 mg/dL 12    CREATININE      0.55 - 1.02 mg/dL 0.70    CALCIUM      8.7 - 10.4 mg/dL 8.7    CALCULATED OSMOLALITY      275 - 295 mOsm/kg 287    EGFR       >=60 mL/min/1.73m2 121    AST (SGOT)      <34 U/L 14    ALT (SGPT)      10 - 49 U/L 18    ALKALINE PHOSPHATASE      37 - 98 U/L 81    Total Bilirubin      0.3 - 1.2 mg/dL 0.2 (L)    PROTEIN, TOTAL      5.7 - 8.2 g/dL 6.9    Albumin      3.2 - 4.8 g/dL 3.5    Globulin      2.0 - 3.5 g/dL 3.4    A/G Ratio      1.0 - 2.0  1.0    Patient Fasting for CMP? Yes       Legend:  (L) Low    PLAN:  -PRN Prednisone 5 mg q AM   - Methotrexate 2.5 mg tablets x 5 tabs qw with daily folic acid 1 mg. Side effects of MTX include  possible alopecia, hepatotoxicity, mucositis, GI upset, as well as immunosuppressive effects.  Also, emphasized importance of avoiding EtOH while on MTX.  Explained that MTX may take several weeks to months for noticeable symptom improvement. Discussed with patient, given possible immunosuppressive effects of MTX, the importance of keeping vaccinations up-to-date.    - We will monitor labs every 4-6 weeks while on MTX until dosing stabilized.  Then may consider trending labs every 3 months.   - Consult/evaluation communicated with referring physician/provider.     Patient to follow-up in 6-8 weeks with labs drawn prior.     Srini Dyson DO  2025   3:01 PM        Discussed with patient that they are on chronic treatment with a high-risk medication that requires close lab monitoring for possible drug toxicity.  Additionally, discussed with patient give the possible immunosuppressive effects of their medication as well as their underlying hyper-inflammatory state, the importance of keeping vaccinations and age-appropriate screenings up-to-date, given increased risk of complications and malignancies seen in these patient populations.  Patient to f/u with repeat labs drawn prior (standing labs ordered).  Last TB testin2025  Last Hepatitis testin2025

## 2025-04-07 NOTE — TELEPHONE ENCOUNTER
Last visit 2/19/25  Sleep study with L[place] on file DONE 3/22/25  AHI 17.2      6/2/2025  2:30 PM Joni Grier MD E[facility] Pulm [facility] [place]   6/24/2025 10:20 AM Shelley Jaeger APRN [facility] [address]   9/4/2025 11:00 AM Shelley Jaeger APRN [facility] [address]   12/10/2025 10:00 AM Shelley Jaeger APRN [facility] [address]

## 2025-04-07 NOTE — TELEPHONE ENCOUNTER
Pa entered in epic for zepbound for DEEPTI TODAY  Pending approval or denial  Attached OV note and the sleep study.

## 2025-04-08 NOTE — TELEPHONE ENCOUNTER
Go to [facility] for cxr   Zepbound is approved from 3/9/25 to 4/8/2026

## 2025-04-11 ENCOUNTER — OFFICE VISIT (OUTPATIENT)
Age: 30
End: 2025-04-11
Payer: COMMERCIAL

## 2025-04-11 VITALS
DIASTOLIC BLOOD PRESSURE: 78 MMHG | WEIGHT: 293 LBS | SYSTOLIC BLOOD PRESSURE: 128 MMHG | HEIGHT: 67 IN | BODY MASS INDEX: 45.99 KG/M2 | HEART RATE: 88 BPM | RESPIRATION RATE: 16 BRPM

## 2025-04-11 DIAGNOSIS — M06.00 SERONEGATIVE RHEUMATOID ARTHRITIS (HCC): Primary | ICD-10-CM

## 2025-04-11 DIAGNOSIS — M06.4 INFLAMMATORY POLYARTHRITIS (HCC): ICD-10-CM

## 2025-04-11 PROCEDURE — 3008F BODY MASS INDEX DOCD: CPT | Performed by: INTERNAL MEDICINE

## 2025-04-11 PROCEDURE — 99215 OFFICE O/P EST HI 40 MIN: CPT | Performed by: INTERNAL MEDICINE

## 2025-04-11 PROCEDURE — G2211 COMPLEX E/M VISIT ADD ON: HCPCS | Performed by: INTERNAL MEDICINE

## 2025-04-11 PROCEDURE — 3078F DIAST BP <80 MM HG: CPT | Performed by: INTERNAL MEDICINE

## 2025-04-11 PROCEDURE — 3074F SYST BP LT 130 MM HG: CPT | Performed by: INTERNAL MEDICINE

## 2025-04-11 NOTE — PROGRESS NOTES
RHEUMATOLOGY CLINIC- FOLLOW UP     Pat Leiva is a 29 year old female.    ASSESSMENT/PLAN:       ICD-10-CM    1. Seronegative rheumatoid arthritis (HCC): -RF,-CCP, - TETE  M06.00       2. Inflammatory polyarthritis (HCC)  M06.4             DISCUSSION:  Patient presents for f/u of seroneg RA (Prior MRI notable for synovitis consistent with underlying inflammatory arthropathy. RF/CCP/TETE negative).  She does have synovitis on current exam.  We again discussed the risks and benefits of resuming methotrexate to which she is agreeable. Denies family-planning or current pregnancy.    PLAN:  -PRN Prednisone 5 mg q AM   - Methotrexate 2.5 mg tablets x 5 tabs qw with daily folic acid 1 mg. Side effects of MTX include  possible alopecia, hepatotoxicity, mucositis, GI upset, as well as immunosuppressive effects.  Also, emphasized importance of avoiding EtOH while on MTX.  Explained that MTX may take several weeks to months for noticeable symptom improvement. Discussed with patient, given possible immunosuppressive effects of MTX, the importance of keeping vaccinations up-to-date.    - We will monitor labs every 4-6 weeks while on MTX until dosing stabilized.  Then may consider trending labs every 3 months.   - Consult/evaluation communicated with referring physician/provider.    Patient to follow-up in 6-8 weeks with labs drawn prior.    Srini Dyson DO  4/11/2025   2:58 PM      There is a longitudinal care relationship with me, the care plan reflects the ongoing nature of the continuous relationship of care, and the medical record indicates that there is ongoing treatment of a serious/complex medical condition which I am currently managing.  is Applicable.         Discussed with patient that they are on chronic treatment with a high-risk medication that requires close lab monitoring for possible drug toxicity.  Additionally, discussed with patient give the possible immunosuppressive effects of their medication as  well as their underlying hyper-inflammatory state, the importance of keeping vaccinations and age-appropriate screenings up-to-date, given increased risk of complications and malignancies seen in these patient populations.  Patient to f/u with repeat labs drawn prior (standing labs ordered).  Last TB testin2025  Last Hepatitis testin2025        SUBJECTIVE :     25 Follow-Up: Persistent polyarthralgias including: hands, knees, feet, shoulder. Feels like there is \"a ball at the bottom of my feet\". In the interim, had messaged us that she had not started the methotrexate given concerns of side effects.  Tries to use as needed prednisone sparingly.  Was trying more natural, holistic approaches. Recently diagnosed with sleep apnea with plans for a CPAP.  Does take    Current Medications:  Ibuprofen-helpful   PRN Prednisone 5 mg q AM     Medication History:  Corticosteroid-Responsive    IM Depot Medrol 40      Interval History:     2024 Initial Consult: I had the pleasure of seeing Pat KARLA Leiva on 2024 as a new outpatient consultation for polyarthralgias. The patient was originally referred by her PCP.     29 year old female w/ PMH PCOS, prediabetes who presents to clinic today.Of note, patient formally following with Dr. Yoder with last appointment 10/17/2017 for suspected seronegative rheumatoid arthritis.  Prior MRI with synovitis affecting her right hand.  During that appointment, patient prescribed a prednisone taper and patient started on methotrexate with folic acid daily.  Plans follow-up in 1 month.    Since giving birth to her daughter about a year and a half ago, has been having worsening polyarthralgias including her knees, ankles, hands, arms.  Sometimes the pain can make it hard for her to walk, with her experiencing falls as if her knees and ankles are \"giving out \".  She works as a mental health  with recent work injury as well.  States that she has  been having these arthralgias since misael year of high school.  Has AM stiffness until lunchtime.  Also has a gelling phenomenon with worsening symptoms after rest, such as lying on the couch with her daughter.  She is not family-planning and does not plan to become pregnant again.  Symptoms corticosteroid responsive in the past.  Also notes an occasional itchy rash affecting her hands and knees which resolves with Allegra-D.  No measured unexplained fevers, chills, photosensitive rash, Raynaud's phenomenon, oral/nasal ulcers, serositis.  Family history notable for a mother with psoriasis, father with gout, distal paternal cousin with RA.  She is unsure whether methotrexate helped in the past.    2/7//25 Follow-Up: Patient noting persistent polyarthralgias and joint swelling.  As generalized fatigue.  Right greater than left hand pain.  Taking.  Prednisone 5 mg daily about 2-3 times a week    HISTORY:  Past Medical History:    Allergic rhinitis    Anxiety    Arthritis    Depression    Obesity    PCOS (polycystic ovarian syndrome)    Rheumatoid arthritis (HCC)      Social Hx Reviewed   Family Hx Reviewed     Medications (Active prior to today's visit):  Current Outpatient Medications   Medication Sig Dispense Refill    ibuprofen 800 MG Oral Tab Take 1 tablet (800 mg total) by mouth every 8 (eight) hours as needed for Pain. 90 tablet 2    Tirzepatide-Weight Management (ZEPBOUND) 2.5 MG/0.5ML Subcutaneous Solution Auto-injector Inject 2.5 mg into the skin once a week. Dx: DEEPTI with AHI 17.2 2 mL 0    escitalopram 10 MG Oral Tab Take 1 tablet (10 mg total) by mouth every morning. 90 tablet 1    Phentermine HCl 15 MG Oral Cap Take 1 capsule (15 mg total) by mouth every morning. 30 capsule 3    topiramate 25 MG Oral Tab Take 1 tablet (25 mg total) by mouth 2 (two) times daily. Start 1 tab daily for 7 days, then can increase to twice a day as prescribed 60 tablet 3    Cyanocobalamin (VITAMIN B 12 OR) Take by mouth.       predniSONE 5 MG Oral Tab Take 1 tablet (5 mg total) by mouth daily as needed. Take Prednisone 5 mg daily as needed for pain in the morning with food. 90 tablet 1    methotrexate 2.5 MG Oral Tab Take 5 tablets of Methotrexate 2.5 mg once weekly with food. 20 tablet 2    folic acid 1 MG Oral Tab Take 1 tablet (1 mg total) by mouth daily. 90 tablet 2       Allergies:  Allergies[1]      ROS:   Review of Systems   Constitutional:  Negative for chills and fever.   HENT:  Negative for congestion, hearing loss, mouth sores, nosebleeds and trouble swallowing.    Eyes:  Negative for photophobia, pain, redness and visual disturbance.   Respiratory:  Negative for cough and shortness of breath.    Cardiovascular:  Negative for chest pain, palpitations and leg swelling.   Gastrointestinal:  Negative for abdominal pain, blood in stool, diarrhea and nausea.   Endocrine: Negative for cold intolerance and heat intolerance.   Genitourinary:  Negative for dysuria, frequency and hematuria.   Musculoskeletal:  Positive for arthralgias, gait problem and joint swelling. Negative for back pain, myalgias, neck pain and neck stiffness.   Skin:  Negative for color change and rash.   Neurological:  Negative for dizziness, weakness, numbness and headaches.   Psychiatric/Behavioral:  Negative for confusion and dysphoric mood.         PHYSICAL EXAM:     Constitutional:  Well developed, Well nourished, No acute distress  HENT:  Normocephalic, Atraumatic, Bilateral external ears normal, Oropharynx moist, No oral exudates.  Neck: Normal range of motion, No tenderness, Supple, No stridor.  Eyes:  PERRL, EOMI, Conjunctiva normal, No discharge.  Respiratory:  Normal breath sounds, No respiratory distress, No wheezing.  Cardiovascular:  Normal heart rate, Normal rhythm, No murmurs, No rubs, No gallops.  GI:  Bowel sounds normal, Soft, No tenderness, No masses, No pulsatile masses.  : No CVA tenderness.  Musculoskeletal:  A comprehensive 28 count joint  exam was done and was negative for swelling or tenderness except as noted. Inspections for misalignment, asymmetry, crepitation, defects, tenderness, masses, nodules, effusions, range of motion, and stability in the upper and lower extremities bilaterally are all normal unless noted.           Integument:  Warm, Dry, No erythema, No rash.  Lymphatic:  No lymphadenopathy noted.  Neurologic:  Alert & oriented x 3, Normal motor function, Normal sensory function, No focal deficits noted.  Psychiatric:  Affect normal, Judgment normal, Mood normal.    LABS:     Prior lab work reviewed and notable for:     2025:  QuantiFERON-TB testing:, Acute hepatitis panel negative  Vitamin D 14.5 (low)  TSH 1.266 WNL    CMP with BUN 12, CR 0.7, LFTs nonelevated, no gamma gap  CBC with normal WBC, Hg 11.2 normocytic,  WNL    2024:  CMP with BUN 16, CR 0.79, LFTs not elevated, no gamma gap  CBC with normal WBC, Hg, PLT    10/29/2024:  TB skin test negative    2018:  ESR 33 (slightly high), CRP 0.79 WNL    2017:  RF less than 5 WNL, CCP 2 WNL    2015:  ESR 38 (high), CRP 1 borderline  SSA/SSB, TETE screen negative  C3 162 WNL, C4 37 WNL  CCP 0.8 WNL, RF less than 3 WNL  ACE 59 WNL  TSH 0.9 WNL    2014:  CK 35 (low)  Imagin2017 right hand XR:  Impression   CONCLUSION: There is intercarpal joint space narrowing with degenerative change in the scaphoid, lunate, capitate, and distal radius, which can be seen with arthritis.  The changes have progressed/developed since prior exams.       2015 right wrist MRI:  CONCLUSION:   1. Marked synovitis involving the carpus and second through fifth      carpometacarpal joints with associated thickened enhancing synovium,      joint effusions and reactive bone edema. Main differential      considerations include rheumatoid arthritis, less likely gout and      infection.   2. Mild reactive edema in the thenar musculature.   3. Tiny pinhole-sized  perforation the midportion of scapholunate ligament.      Volar and dorsal aspects of scapholunate ligament intact.               [1]   Allergies  Allergen Reactions    Gramineae Pollens SHORTNESS OF BREATH    Pollen Extract SHORTNESS OF BREATH    Wheat HIVES, ITCHING and UNKNOWN     Allergy no reaction on file    Wheat Extract SHORTNESS OF BREATH    Naphazoline-Glycerin-Zinc Sulf OTHER (SEE COMMENTS)     Grass    Seasonal      Grass      Wheat Bran      Allergy no reaction on file     Cats Claw, Uncaria Tomentosa ITCHING     Watery eyes

## 2025-05-07 DIAGNOSIS — Z51.81 ENCOUNTER FOR THERAPEUTIC DRUG MONITORING: ICD-10-CM

## 2025-05-07 DIAGNOSIS — G47.33 OSA (OBSTRUCTIVE SLEEP APNEA): ICD-10-CM

## 2025-05-18 ENCOUNTER — HOSPITAL ENCOUNTER (EMERGENCY)
Age: 30
Discharge: HOME | End: 2025-05-18
Attending: EMERGENCY MEDICINE
Payer: COMMERCIAL

## 2025-05-18 ENCOUNTER — APPOINTMENT (OUTPATIENT)
Dept: CT IMAGING | Age: 30
End: 2025-05-18
Attending: EMERGENCY MEDICINE
Payer: COMMERCIAL

## 2025-05-18 ENCOUNTER — APPOINTMENT (OUTPATIENT)
Dept: GENERAL RADIOLOGY | Age: 30
End: 2025-05-18
Attending: EMERGENCY MEDICINE
Payer: COMMERCIAL

## 2025-05-18 VITALS
TEMPERATURE: 98 F | HEIGHT: 67 IN | OXYGEN SATURATION: 98 % | BODY MASS INDEX: 45.99 KG/M2 | WEIGHT: 293 LBS | DIASTOLIC BLOOD PRESSURE: 67 MMHG | HEART RATE: 86 BPM | SYSTOLIC BLOOD PRESSURE: 107 MMHG | RESPIRATION RATE: 20 BRPM

## 2025-05-18 DIAGNOSIS — M54.6 BACK PAIN OF THORACOLUMBAR REGION: Primary | ICD-10-CM

## 2025-05-18 DIAGNOSIS — M54.50 BACK PAIN OF THORACOLUMBAR REGION: Primary | ICD-10-CM

## 2025-05-18 PROCEDURE — 99284 EMERGENCY DEPT VISIT MOD MDM: CPT

## 2025-05-18 PROCEDURE — 71046 X-RAY EXAM CHEST 2 VIEWS: CPT | Performed by: EMERGENCY MEDICINE

## 2025-05-18 PROCEDURE — 72125 CT NECK SPINE W/O DYE: CPT | Performed by: EMERGENCY MEDICINE

## 2025-05-18 PROCEDURE — 72100 X-RAY EXAM L-S SPINE 2/3 VWS: CPT | Performed by: EMERGENCY MEDICINE

## 2025-05-18 PROCEDURE — 72072 X-RAY EXAM THORAC SPINE 3VWS: CPT | Performed by: EMERGENCY MEDICINE

## 2025-05-18 RX ORDER — CYCLOBENZAPRINE HCL 10 MG
10 TABLET ORAL 3 TIMES DAILY PRN
Qty: 20 TABLET | Refills: 0 | Status: SHIPPED | OUTPATIENT
Start: 2025-05-18 | End: 2025-05-25

## 2025-05-18 RX ORDER — TIRZEPATIDE 5 MG/.5ML
INJECTION, SOLUTION SUBCUTANEOUS
COMMUNITY
End: 2025-05-19
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
## 2025-05-18 NOTE — DISCHARGE INSTRUCTIONS
Tylenol or Advil as needed for milder pain take Flexeril for severe pain apply ice 20 is 4 times a day follow-up your doctor in a few days if not better return if worse

## 2025-05-19 ENCOUNTER — PATIENT MESSAGE (OUTPATIENT)
Dept: INTERNAL MEDICINE CLINIC | Facility: CLINIC | Age: 30
End: 2025-05-19

## 2025-05-19 DIAGNOSIS — Z51.81 ENCOUNTER FOR THERAPEUTIC DRUG MONITORING: Primary | ICD-10-CM

## 2025-05-19 DIAGNOSIS — R73.03 PREDIABETES: ICD-10-CM

## 2025-05-19 DIAGNOSIS — E66.813 CLASS 3 SEVERE OBESITY WITH SERIOUS COMORBIDITY AND BODY MASS INDEX (BMI) OF 45.0 TO 49.9 IN ADULT, UNSPECIFIED OBESITY TYPE: ICD-10-CM

## 2025-05-19 DIAGNOSIS — G47.33 OSA (OBSTRUCTIVE SLEEP APNEA): ICD-10-CM

## 2025-05-19 RX ORDER — TIRZEPATIDE 10 MG/.5ML
10 INJECTION, SOLUTION SUBCUTANEOUS WEEKLY
Qty: 2 ML | Refills: 0 | Status: SHIPPED | OUTPATIENT
Start: 2025-05-19

## 2025-05-19 RX ORDER — TIRZEPATIDE 2.5 MG/.5ML
INJECTION, SOLUTION SUBCUTANEOUS
Qty: 2 ML | Refills: 0 | OUTPATIENT
Start: 2025-05-19

## 2025-05-19 RX ORDER — TIRZEPATIDE 7.5 MG/.5ML
7.5 INJECTION, SOLUTION SUBCUTANEOUS WEEKLY
Qty: 2 ML | Refills: 0 | Status: SHIPPED | OUTPATIENT
Start: 2025-05-19

## 2025-06-02 ENCOUNTER — OFFICE VISIT (OUTPATIENT)
Facility: CLINIC | Age: 30
End: 2025-06-02
Payer: COMMERCIAL

## 2025-06-02 VITALS
BODY MASS INDEX: 45.99 KG/M2 | SYSTOLIC BLOOD PRESSURE: 132 MMHG | OXYGEN SATURATION: 99 % | HEART RATE: 67 BPM | TEMPERATURE: 97 F | DIASTOLIC BLOOD PRESSURE: 74 MMHG | HEIGHT: 67 IN | WEIGHT: 293 LBS | RESPIRATION RATE: 18 BRPM

## 2025-06-02 DIAGNOSIS — G47.33 OSA (OBSTRUCTIVE SLEEP APNEA): Primary | ICD-10-CM

## 2025-06-02 DIAGNOSIS — E66.813 CLASS 3 SEVERE OBESITY WITH SERIOUS COMORBIDITY AND BODY MASS INDEX (BMI) OF 45.0 TO 49.9 IN ADULT: ICD-10-CM

## 2025-06-02 DIAGNOSIS — G47.19 DAYTIME HYPERSOMNOLENCE: ICD-10-CM

## 2025-06-02 DIAGNOSIS — M06.031 RHEUMATOID ARTHRITIS INVOLVING RIGHT WRIST WITH NEGATIVE RHEUMATOID FACTOR (HCC): ICD-10-CM

## 2025-06-02 PROCEDURE — 3075F SYST BP GE 130 - 139MM HG: CPT | Performed by: INTERNAL MEDICINE

## 2025-06-02 PROCEDURE — 3078F DIAST BP <80 MM HG: CPT | Performed by: INTERNAL MEDICINE

## 2025-06-02 PROCEDURE — 99204 OFFICE O/P NEW MOD 45 MIN: CPT | Performed by: INTERNAL MEDICINE

## 2025-06-02 PROCEDURE — 3008F BODY MASS INDEX DOCD: CPT | Performed by: INTERNAL MEDICINE

## 2025-06-02 RX ORDER — TIRZEPATIDE 5 MG/.5ML
INJECTION, SOLUTION SUBCUTANEOUS
COMMUNITY
Start: 2025-05-09

## 2025-06-02 NOTE — PATIENT INSTRUCTIONS
Initiate auto-titrating CPAP at 5-20 cwp at bedtime  Advise DME company to perform a Mask Fit   Then Obtain Download data for compliance and efficacy from CPAP machine in 30 days   Schedule CPAP titration sleep study to be interpreted by Dr. Joni Grier, will then arrange a NEW CPAP machine   Advised about weight loss   Advised against drowsy driving and to avoid alcoholic beverage and respiratory depressants as these may worsen sleep apnea    Follow up :2 months     Joni Grier MD      Obstructive Sleep Apnea  Obstructive sleep apnea is a condition caused by air passages becoming narrowed or blocked during sleep. As a result, breathing stops for short periods. Your body wakes up enough for breathing to start again. But you don't remember it. The cycle of stopped breathing and brief awakenings can repeat dozens of times a night. This prevents the body from getting to the deeper stages of sleep that are needed for good rest.   Signs of sleep apnea include loud snoring, noisy breathing, and gasping sounds during sleep. People with sleep apnea often find they use the bathroom many times during the night. Daytime symptoms include waking up tired after a full night's sleep and waking up with headaches. They can also include feeling very sleepy or falling asleep during the day, and having problems with memory or concentration.   Risk factors for sleep apnea include:  Being overweight  Being assigned male at birth, or being in menopause  Smoking  Using alcohol or sedating medicines  Having enlarged structures in the nose or throat such as enlarged tonsils or adenoids, or extra tissue in the airway  Home care  Lifestyle changes that can help treat snoring and sleep apnea include:   If you're overweight, talk with your healthcare provider about a weight-loss plan for you.  Don't drink alcohol for 3 to 4 hours before bedtime.  Don't take sedating medicines. Ask your healthcare provider about the medicines you take.  If  you smoke, talk to your provider about ways to quit. It's important to stay away from secondhand smoke. Don't use e-cigarettes because of their harmful side effects.  Sleep on your side. This can help prevent gravity from pulling relaxed throat tissues into your breathing passages.  If you have allergies or sinus problems that block your nose, ask your provider for help.  Use positive airway pressure (PAP). Discuss with your provider the benefits of using PAP at home. And talk about the type of PAP that's best for you.  Follow-up care  Follow up with your healthcare provider, or as advised. A diagnosis of sleep apnea is made with a sleep study. Your provider can tell you more about this test.   When to get medical care  See your healthcare provider if you have daytime symptoms of sleep apnea. These include:   Waking up tired after a full night's sleep  Waking up with a headache  Feeling very sleepy or falling asleep during the day  Having problems with memory or concentration  Also talk with your provider if your partner tells you that you snore, gasp for air, or stop breathing while you sleep.   Seeing your provider is important because sleep apnea can make you more likely to have certain health problems. These include high blood pressure, heart attack, stroke, and sexual dysfunction. If you have sleep apnea, talk with your healthcare provider about the best treatments for you.   [employer] last reviewed this educational content on 5/1/2022 © 2000-2023 [employer]. All rights reserved. This information is not intended as a substitute for professional medical care. Always follow your healthcare professional's instructions.        Continuous Positive Air Pressure (CPAP)     A mask over the nose gently directs air into the throat to keep the airway open.     Continuous positive air pressure (CPAP) uses gentle air pressure to hold the airway open. CPAP is often the most effective treatment for sleep apnea.  It works very well as a treatment for adults diagnosed with obstructive sleep apnea with a lot of sleepiness. But keep in mind that it can take several adjustments before the setup is right for you.   How CPAP works  The CPAP machine  is a small portable pump that sits beside the bed. The pump sends air through a hose, which is held over your nose alone, or nose and mouth by a mask. Mild air pressure is gently pushed through your airway. The air pressure nudges sagging tissues aside. This widens the airway so you can breathe better. CPAP may be combined with other kinds of therapy for sleep apnea.   Types of air pressure treatments  There are different types of CPAP. Your doctor or CPAP technician will help you decide which type is best for you:   Basic CPAP keeps the pressure constant all night long.  A bilevel device (BiPAP) provides more pressure when you breathe in and less when you breathe out. A BiPAP machine also may be set to provide automatic breaths to maintain breathing if you stop breathing while sleeping.  An autoCPAP device automatically adjusts pressure throughout the night and in response to changes such as body position, sleep stage, and snoring.  Mary last reviewed this educational content on 7/1/2019  © 8196-2201 [employer]. All rights reserved. This information is not intended as a substitute for professional medical care. Always follow your healthcare professional's instructions.       Allergic Rhinitis  Allergic rhinitis is an allergic reaction that affects the nose, and often the eyes. It’s often known as nasal allergies. Nasal allergies are often due to things in the environment that are breathed in. Depending what you are sensitive to, nasal allergies may occur only during certain seasons, or they may occur year round. Common indoor allergens include house dust mites, mold, cockroaches, and pet dander. Outdoor allergens include pollen from trees, grasses, and weeds.    Symptoms include a drippy, stuffy, and itchy nose. They also include sneezing and red and itchy eyes. You may feel tired more often. Severe allergies may also affect your breathing and trigger a condition called asthma.   Tests can be done to see what allergens are affecting you. You may be referred to an allergy specialist for testing and further evaluation.  Home care  Your healthcare provider may prescribe medicines to help relieve allergy symptoms. These may include oral medicines, nasal sprays, or eye drops.  Ask your provider for advice on how to stay away from substances that you are allergic to. Below are a few tips for each type of allergen.  Pet dander:  Do not have pets with fur and feathers.  If you have a pet, keep it out of your bedroom and off upholstered furniture.  Pollen:  When pollen counts are high, keep windows of your car and home closed. If possible, use an air conditioner instead.  Wear a filter mask when mowing or doing yard work.  House dust mites:  Wash bedding every week in warm water and detergent and dry on a hot setting.  Cover the mattress, box spring, and pillows with allergy covers.   If possible, sleep in a room with no carpet, curtains, or upholstered furniture.  Cockroaches:  Store food in sealed containers.  Remove garbage from the home promptly.  Fix water leaks.  Mold:  Keep humidity low by using a dehumidifier or air conditioner. Keep the dehumidifier and air conditioner clean and free of mold.  Clean moldy areas with bleach and water. Don't mix bleach with other .  In general:  Vacuum once or twice a week. If possible, use a vacuum with a high-efficiency particulate air (HEPA) filter.  Don't smoke. Stay away from cigarette smoke. Cigarette smoke is an irritant that can make symptoms worse.  Follow-up care  Follow up as advised by the healthcare provider or our staff. If you were referred to an allergy specialist, make this appointment promptly.  When to seek  medical advice  Call your healthcare provider or get medical care right away if the following occur:  Coughing  Fever of 100.4°F (38°C) or higher, or as directed by your healthcare provider  Raised red bumps (hives)  Continuing symptoms, new symptoms, or worsening symptoms  Call 911  Call 911 if you have:  Trouble breathing  Severe swelling of the face or severe itching of the eyes or mouth  Wheezing or shortness of breath  Chest tightness  Dizziness or lightheadedness  Feeling of doom  Stomach pain, bloating, vomiting, or diarrhea  [employer] last reviewed this educational content on 10/1/2019  © 2906-9760 [employer]. All rights reserved. This information is not intended as a substitute for professional medical care. Always follow your healthcare professional's instructions.
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
## 2025-06-03 ENCOUNTER — OFFICE VISIT (OUTPATIENT)
Dept: FAMILY MEDICINE CLINIC | Facility: CLINIC | Age: 30
End: 2025-06-03
Payer: COMMERCIAL

## 2025-06-03 VITALS
BODY MASS INDEX: 45.99 KG/M2 | DIASTOLIC BLOOD PRESSURE: 70 MMHG | HEART RATE: 67 BPM | HEIGHT: 67 IN | WEIGHT: 293 LBS | RESPIRATION RATE: 16 BRPM | TEMPERATURE: 97 F | SYSTOLIC BLOOD PRESSURE: 110 MMHG | OXYGEN SATURATION: 98 %

## 2025-06-03 DIAGNOSIS — M62.838 MUSCLE SPASM: ICD-10-CM

## 2025-06-03 DIAGNOSIS — M54.6 CHRONIC BILATERAL THORACIC BACK PAIN: ICD-10-CM

## 2025-06-03 DIAGNOSIS — D64.9 LOW HEMOGLOBIN: Primary | ICD-10-CM
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
## 2025-06-03 DIAGNOSIS — G89.29 CHRONIC BILATERAL THORACIC BACK PAIN: ICD-10-CM

## 2025-06-03 PROCEDURE — 99214 OFFICE O/P EST MOD 30 MIN: CPT | Performed by: FAMILY MEDICINE

## 2025-06-03 PROCEDURE — 3078F DIAST BP <80 MM HG: CPT | Performed by: FAMILY MEDICINE

## 2025-06-03 PROCEDURE — 3008F BODY MASS INDEX DOCD: CPT | Performed by: FAMILY MEDICINE

## 2025-06-03 PROCEDURE — 3074F SYST BP LT 130 MM HG: CPT | Performed by: FAMILY MEDICINE

## 2025-06-03 RX ORDER — CYCLOBENZAPRINE HCL 10 MG
10 TABLET ORAL NIGHTLY PRN
Qty: 15 TABLET | Refills: 0 | Status: SHIPPED | OUTPATIENT
Start: 2025-06-03
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
## 2025-06-04 ENCOUNTER — TELEMEDICINE (OUTPATIENT)
Age: 30
End: 2025-06-04
Payer: COMMERCIAL

## 2025-06-04 DIAGNOSIS — M06.4 INFLAMMATORY POLYARTHRITIS (HCC): ICD-10-CM

## 2025-06-04 DIAGNOSIS — M06.00 SERONEGATIVE RHEUMATOID ARTHRITIS (HCC): Primary | ICD-10-CM

## 2025-06-04 DIAGNOSIS — E66.813 CLASS 3 SEVERE OBESITY WITH SERIOUS COMORBIDITY AND BODY MASS INDEX (BMI) OF 45.0 TO 49.9 IN ADULT, UNSPECIFIED OBESITY TYPE: ICD-10-CM

## 2025-06-04 DIAGNOSIS — Z51.81 ENCOUNTER FOR THERAPEUTIC DRUG MONITORING: ICD-10-CM

## 2025-06-04 PROCEDURE — 98006 SYNCH AUDIO-VIDEO EST MOD 30: CPT | Performed by: INTERNAL MEDICINE

## 2025-06-04 RX ORDER — METHOTREXATE 2.5 MG/1
TABLET ORAL
Qty: 20 TABLET | Refills: 2 | Status: SHIPPED | OUTPATIENT
Start: 2025-06-04

## 2025-06-04 RX ORDER — PREDNISONE 5 MG/1
5 TABLET ORAL DAILY PRN
Qty: 90 TABLET | Refills: 1 | Status: SHIPPED | OUTPATIENT
Start: 2025-06-04

## 2025-06-04 NOTE — TELEPHONE ENCOUNTER
LOV: 6/4/25  Future Appointments   Date Time Provider Department Center   6/24/2025 10:20 AM Shelley Jaeger APRALIDA [facility]   7/24/2025  8:00 PM  SLEEP ROOMS [facility]   9/4/2025 11:00 AM Shelley Jaeger APRALIDA [facility]   12/10/2025 10:00 AM Shelley Jaeger APRALIDA [facility]     ASSESSMENT/PLAN:          ICD-10-CM     1. Seronegative rheumatoid arthritis (HCC): -RF,-CCP, - TETE  M06.00         2. Inflammatory polyarthritis (HCC)  M06.4                  DISCUSSION:  Patient presents for f/u of seroneg RA (Prior MRI notable for synovitis consistent with underlying inflammatory arthropathy. RF/CCP/TETE negative).  Patient was initially feeling better since starting methotrexate albeit with some persistent arthralgias.  Therefore, we will consider increasing dose, however, we will need to update labs prior.  Discussed risk/benefits and patient agreeable.     PLAN:  -PRN Prednisone 5 mg q AM   - Will consider increasing methotrexate as follows pending updated lab work: Methotrexate 2.5 mg tablets x 7 tabs qw with daily folic acid 1 mg. Side effects of MTX include  possible alopecia, hepatotoxicity, mucositis, GI upset, as well as immunosuppressive effects.  Also, emphasized importance of avoiding EtOH while on MTX.  Explained that MTX may take several weeks to months for noticeable symptom improvement. Discussed with patient, given possible immunosuppressive effects of MTX, the importance of keeping vaccinations up-to-date.    - We will monitor labs every 4-6 weeks while on MTX until dosing stabilized.  Then may consider trending labs every 3 months.   - Consult/evaluation communicated with referring physician/provider.     I will MyChart patient on receipt of above, possible increase in methotrexate pending results.  If methotrexate increased, would repeat labs on this increased dose in 1 month for monitoring.  Otherwise, follow-up in October/November with repeat labs prior.      Srini Dyson DO  6/4/2025   1:57 PM
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
## 2025-06-05 RX ORDER — TOPIRAMATE 25 MG/1
TABLET, FILM COATED ORAL
Qty: 180 TABLET | Refills: 0 | Status: SHIPPED | OUTPATIENT
Start: 2025-06-05

## 2025-06-05 NOTE — TELEPHONE ENCOUNTER
Requesting   Requested Prescriptions     Pending Prescriptions Disp Refills    TOPIRAMATE 25 MG Oral Tab [Pharmacy Med Name: TOPIRAMATE 25MG TABLETS] 180 tablet 0     Sig: TAKE 1 TABLET(25 MG) BY MOUTH TWICE DAILY. START 1 TABLET DAILY FOR 7 DAYS, THEN CAN. INCREASE TO 2 TIMES A DAY AS DIRECTED      LOV: 02/19/25  RTC: 06/24/25  Filled: 02/24/25 #60 with 3 refills    Future Appointments   Date Time Provider Department Center   6/24/2025 10:20 AM Shelley Jaeger APRN [facility]   7/24/2025  8:00 PM  SLEEP ROOMS [facility]   9/4/2025 11:00 AM Shelley Jaeger APRN [facility]   12/10/2025 10:00 AM Shelley Jaeger APRN [facility]

## 2025-06-09 ENCOUNTER — PATIENT MESSAGE (OUTPATIENT)
Dept: FAMILY MEDICINE CLINIC | Facility: CLINIC | Age: 30
End: 2025-06-09

## 2025-06-12 ENCOUNTER — TELEPHONE (OUTPATIENT)
Dept: PHYSICAL THERAPY | Facility: HOSPITAL | Age: 30
End: 2025-06-12

## 2025-06-13 ENCOUNTER — OFFICE VISIT (OUTPATIENT)
Dept: PHYSICAL THERAPY | Age: 30
End: 2025-06-13
Attending: FAMILY MEDICINE
Payer: COMMERCIAL

## 2025-06-13 DIAGNOSIS — M54.6 CHRONIC BILATERAL THORACIC BACK PAIN: ICD-10-CM

## 2025-06-13 DIAGNOSIS — G89.29 CHRONIC BILATERAL THORACIC BACK PAIN: ICD-10-CM
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
## 2025-06-13 DIAGNOSIS — M62.838 MUSCLE SPASM: ICD-10-CM

## 2025-06-13 PROCEDURE — 97161 PT EVAL LOW COMPLEX 20 MIN: CPT

## 2025-06-13 PROCEDURE — 97110 THERAPEUTIC EXERCISES: CPT

## 2025-06-13 PROCEDURE — 97140 MANUAL THERAPY 1/> REGIONS: CPT
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
## 2025-06-23 NOTE — PROGRESS NOTES
[facility] Weight Management follow up via video visit:    Subjective    This visit is conducted using Telemedicine with live, interactive video and audio.    Chief Complaint:  Routine follow up visit for lifestyle and medical management for overweight, obesity, or morbid obesity. LOV in clinic weight: 319#.    PMH reviewed. Bariatric surgery planned or hx of surgery in the past: no.    HPI:   Pat Leiva is a 29 year old female who is being followed up today for lifestyle and medical management as deemed appropriate for overweight, obesity or morbid obesity. Patient reports weight loss monitoring at home via scale with a weight of 310#. This appears to be a weight loss since LOV 4 months ago in clinic as NP consult. Patient has been consistent with medication and maintained on Phen/Topamax. Zepbound is affordable and covered, currently on 7.5 mg weekly dose.    Questions/Concerns/Comments since LOV: Has not seen the dietician, nor completed labs in EMR by HCP. Had SS on 3/22/25 with AHI 17.2. Has been on CPAP and sleep has significantly improved. More mental clarity as a result as well. Appetite is significantly reduced, at time having to remind herself to eat. Meals are variable as work is often on the road from home to home.    Lifestyle/Social Hx Reviewed:    [facility] Weight [facility] Follow Up    Question 6/24/2025  9:24 AM CDT - Filed by Patient   Please describe a success moment: Seeing 309 on the scale. Starting weight was 325ish i think.   Please describe a challenging moment/needs for improvement: Remembering to eat.   Please complete this 24 hour food journal, listing everything you had to eat in the past day. Include the average time of day you ate these meals at    List foods, qty and prep for breakfast: N/A   List foods, qty and prep for lunch. Water   List foods, qty and prep for dinner. Polish sausage and Water   List foods, qty and prep for snacks. N/A   List the types and qty of fluids  consumed Water, Beet Juice   On average, how many meals did you eat out per week? 3   Exercise    How many days per week are you active or exercise 2   On average, how many days were anaerobic (strength/resistance) exercises performed? 2   On average, how many days were aerobic (cardio) exercises performed? 2   Perceived level of exertion on a scale of 1-5, with 5 being very intense: 3   Stress    Average stress level on a scale of 1-10, with 10 being extremely stressed: 6   If greater than 5/1O how would you grade your coping mechanisms? moderate   Sleep hours and integrity    How many hours of uninterrupted sleep do you get a night: 7.5   Do you feel rested in the morning: Yes   If no, what may have been disrupting your sleep?    Please list any goal(s) for your next visit Under 300 pounds     ROS  General: feeling well, denies fatigue  EYES: denies vision changes or high pain/pressure.  CV: denies cp, palpitations  Resp: denies sob  GI: denies abdominal pain. Denies N/V/D, occasional constipation- controlled when drinking enough water.  Neuro: denies paresthesia or cognitive changes  Psych: denies any mood changes    Physical Exam:  >   BP Readings from Last 3 Encounters:   06/03/25 110/70   06/02/25 132/74   05/18/25 107/67       Home weight: see above  Home BP: not available  Home blood sugars: n/a  Today's calculated BMI from reported weight: 48.6    General: patient speaking in full sentences, no increased work of breathing. alert, appears stated age, cooperative, no distress, and morbidly obese  HENT: normocephalic  Resp: Breathing is non labored  Psych: patient appears cheerful, smiling, making good eye contact    Diagnoses and all orders for this visit:    Encounter for therapeutic drug monitoring    DEEPTI (obstructive sleep apnea)  Comments:  SS 3/22/25 AHI 17.2    Class 3 severe obesity with serious comorbidity and body mass index (BMI) of 45.0 to 49.9 in adult, unspecified obesity  type  Comments:  Baseline BMI: 51.83 (7/19/24)    Prediabetes  Comments:  1/2025 HgbA1c WNL - controlled with medication and weight loss    Low level of high density lipoprotein (HDL)        Patient Instructions   Continue making lifestyle changes that focus on good nutrition, regular exercise and stress management.    Medication Plan: Finish up Zepbound 7.5 mg weekly pens and then increase to 10 mg weekly. Prescription at pharmacy. Alert me if any refills are needed.    Zepbound is being prescribed for the dual FDA indication for the treatment of Overweight/Obesity and Obstructive Sleep Apnea (DEEPTI).    Tips while taking an injectable medication:    Be an intuitive eater. Listen to your hunger and fullness signals, stopping when you are full.  Consume protein and produce in your day, striving for a rainbow of color of produce.  Reduce portions to staring size of 1 cup size and check in with your gut to see if you are full. Set the timer to slow down your eating pace to allow for 15-20 minutes to complete a meal. Recommend following the \"2 bite rule\".  Reduce refined sugars and high fat foods, as they may contribute to greater side effects of nausea and heartburn.  Stop eating 3 hours before bedtime to allow your food to digest.  Remain hydrated with water or non caloric and non caffeine beverages.  Use over the counter yeny lozenge/supplement to help reduce nausea if needed.  If you have been off your medication for more than 2 weeks please notify our office to determine next dosing, as return to previous dose may not be appropriate or tolerated.  Zepbound can be kept at room temperature for up to 3 weeks.      Next steps to work on before next visit include: Recommend keeping healthy snacks in a cooler in your card to support healthy, balanced nutrition with regular meals. Make a consult with one of our dieticians, Alvin or Gin. Completed labs as ordered by your PCP.    Balanced Nutrition includes:     Build the  mentality of Food 4 Fuel. Clean eating with whole foods and eliminating/reducing ultra processed foods.  Be an intuitive eater and using mindful eating practices.  Eat a balanced plate with protein and produce at all meals: 1/4 plate- protein, 1/2 plate non starchy veggies, and 1/4 plate fruit or complex carbohydrate.  Drink water with all meals and use a salad plate to naturally reduce portions.  Eliminate/reduce late night eating by stopping after 7pm. Allowing your body to fast for 12 hours (drink only water, tea or black coffee without any additives).                  Staying Healthy While Traveling    by Lety Forrest RD    OAC at www.obesityaction.org Summer 2023 Resource    It’s time to go on a vacation! Summertime is a popular season for travel, and vacations are a wonderful way to unwind, discover new places, and spend time with family and friends. Taking a break from your daily routine is really important. When you return, you’ll feel refreshed and ready for the next things you need to do! As you pack your bags and plan your trip, remember to stay focused on your health and wellness goals. With a bit of planning, it’s not hard to stay active, eat well, and have a great time during your vacation.    Do Your Research and Plan Ahead  Keeping yourself on track during a vacation might feel like an impossible challenge. However, with the right preparation, it’s entirely achievable. Before you leave, take some time to plan your vacation. Look for ways to include health and nutrition in your itinerary, such as trying new activities and foods. You’ll be pleasantly surprised to find that many places have great options for staying healthy.    Check your Nutrition  Instead of constantly eating sweets, fried foods and junk, seek out healthier alternatives. Choosing nutritious foods while traveling has many advantages. First, eating healthy will give you more energy, which is important for having a fun vacation. Second,  sticking to your plan will give you a sense of accomplishment and satisfaction. Here are some tips to help you choose wisely and prioritize your well-being.    Consider using grocery delivery services. You can shop online from home and have meals and snacks delivered to your hotel. Just make sure your room has a refrigerator. This way, you won’t have to search for a store and can avoid the temptation of unhealthy vacation treats. You can stock up on fresh produce, cheese sticks, popcorn and protein bars to keep in your hotel room.    Make eating out fun by finding new restaurants to try at your destination. Take a moment to look up their menus online and come up with a plan. You can plan to try fresh fish at a local beach restaurant, visit a steakhouse and choose a lean cut of meat with a side salad, or enjoy chicken fajitas at a Mexican restaurant.  Remember to give yourself a break. It’s okay to indulge in a special meal or snack during your vacation. One treat doesn’t mean the whole day is ruined. Just get back on track with your healthy eating at the next meal.    Finding Fitness  Discovering fun fitness activities can help you build a habit that you’ll want to continue. When you’re on vacation, make it a point to set aside time to move your body. This will not only increase your energy levels but also make you feel positive and satisfied about being active. Be creative and think outside the box to come up with exciting ideas to stay active during your vacation!    Try to find ways to move throughout the day. Look for trails in a local park, a gym at your hotel, or join an exercise class for the day. Keep things interesting by varying your activities and trying something new.  Involve the whole family in staying active. Rent bikes for an afternoon, plan a walk after dinner, or try something different like surfing! You can also let your kids choose an activity. You might be surprised to hear what ideas they  have!    Mind Your Mental Health  Vacations are meant to be relaxing and a chance to recharge. Make sure you have a vacation that helps you rest and rejuvenate! Sometimes it’s easy to plan too many activities and take on too many commitments. Take a moment to find balance between fun and fitness.    Pack items you enjoy. Bring your favorite books, puzzles, music, or whatever you love to ensure you have time to do just that. Sneaking away for a few minutes can give you time to spend on your favorite things.  Remember to rest. Vacations can be full of exciting things, but it’s also important to take it easy. Take an afternoon nap or find some quiet time alone to recharge.    Include activities that you enjoy. Vacations offer a lot of things to do, so make sure you have activities that you personally enjoy along with the rest of the family.    How to: Make it Through a Long Day of Travel  Whether you’re taking a road trip for a few hours or a plane across the [place] travel days require some planning. Here are some tips to make your trip easier.    Pack a snack bag for long road trips or airport days. When in the car, consider bringing a cooler with water to avoid sugary snacks at the gas stations. Pack fresh fruits, vegetables, water, cheese and meats. Include snacks like popcorn, pretzels or whole-grain crackers. Airline travel can be more challenging, but you can pack snacks such as protein bars, trail mix or popcorn in your carry-on. Bring a refillable water bottle to stay hydrated throughout the day!    Find ways to walk, even on your busiest travel day. If you’re driving, take quick 10-15-minute walks every couple of hours. It will not only give you some cardio exercise but also reduce stress and improve your mood during long car rides. If you’re stuck in an airport, walk up and down the terminal while you wait.  Bring items like books, adult coloring books or knitting supplies to keep busy and reduce screen time.  This can be a great opportunity to explore a new hobby and let your creative juices flow.  How to: Make Healthy Food Choices When Eating Out  Making healthy food choices while eating out on vacation can be challenging.    Instead of donuts and pastries for breakfast, go for high-protein items like eggs, turkey sausage, turkey ledesma, oats and whole-grain toast. Ask for fresh fruit or low-fat yogurt on the side to make breakfast even more nutritious.  Restaurant portions can be large. If someone in your group is willing to split a meal, that can be an easy solution. Since you might not have enough space to take leftovers with you, you may have to leave some behind.  When looking at the lunch or dinner menu, choose grilled, baked or boiled options. This can save you a lot of calories and fat. Be mindful of side dishes, as they can add up quickly. Consider swapping a high-calorie side item for a side salad, fruit or vegetables.    Desserts can be tempting, but plan ahead if you want to order one. Choose a lower-calorie meal to balance it out. You can also share the dessert with others at your table to enjoy a smaller portion.  How to: Deal With an unexpected change of plans  We’ve all been there. Your perfectly planned vacation goes sideways. How do you make the most of these days? It can be easy to throw in the towel, but always be ready with a backup plan.    The weather can change unexpectedly. A rainy day might ruin your beach or mountain hike plans. Take on the challenge and find new ways to have fun. Look for an indoor pool or an activity center to keep working towards your goals!    Your carefully chosen restaurant may not have healthy options, or your grocery store delivery might be late. Don’t give up; just adapt and make a new decision. Make the best choice you can and move on to the next meal. It’s okay if it’s not perfect!    Sometimes your perfectly planned day doesn’t go as expected. Kids may start  fighting during a trip to the park, or someone might get sick on vacation. Focus on the positive moments and enjoy the time you have.  Wherever you go on your summer vacation, make sure to enjoy the break, relaxation and adventure. Taking time away from your usual routine can be amazing, and keeping up with your health and fitness goals can make your vacation even better.        Return in about 3 months (around 9/24/2025) for as scheduled.    DOCUMENTATION OF TIME SPENT: Code selection for this visit was based on time spent : 30 minutes on date of service in preparing to see the patient, obtaining and/or reviewing separately obtained history, performing a medically appropriate examination, counseling and educating the patient/family/caregiver, ordering medications or testing, referring and communicating with other healthcare providers, documenting clinical information in the electronic medical record, independently interpreting results and communicating results to the patient/family/caregiver and care coordination with the patient's other providers.    Answers submitted by the patient for this visit:  Medical Weight Loss Follow Up (Submitted on 6/24/2025)  If greater than 5/1O how would you grade your coping mechanisms?: moderate

## 2025-06-24 ENCOUNTER — TELEMEDICINE (OUTPATIENT)
Dept: INTERNAL MEDICINE CLINIC | Facility: CLINIC | Age: 30
End: 2025-06-24
Payer: COMMERCIAL

## 2025-06-24 ENCOUNTER — OFFICE VISIT (OUTPATIENT)
Dept: PHYSICAL THERAPY | Age: 30
End: 2025-06-24
Attending: FAMILY MEDICINE
Payer: COMMERCIAL

## 2025-06-24 DIAGNOSIS — G47.33 OSA (OBSTRUCTIVE SLEEP APNEA): ICD-10-CM

## 2025-06-24 DIAGNOSIS — E78.6 LOW LEVEL OF HIGH DENSITY LIPOPROTEIN (HDL): ICD-10-CM

## 2025-06-24 DIAGNOSIS — E66.813 CLASS 3 SEVERE OBESITY WITH SERIOUS COMORBIDITY AND BODY MASS INDEX (BMI) OF 45.0 TO 49.9 IN ADULT, UNSPECIFIED OBESITY TYPE: ICD-10-CM

## 2025-06-24 DIAGNOSIS — Z51.81 ENCOUNTER FOR THERAPEUTIC DRUG MONITORING: Primary | ICD-10-CM

## 2025-06-24 DIAGNOSIS — R73.03 PREDIABETES: ICD-10-CM

## 2025-06-24 PROCEDURE — 98006 SYNCH AUDIO-VIDEO EST MOD 30: CPT | Performed by: NURSE PRACTITIONER

## 2025-06-24 PROCEDURE — 97110 THERAPEUTIC EXERCISES: CPT

## 2025-06-24 PROCEDURE — 97140 MANUAL THERAPY 1/> REGIONS: CPT
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
## 2025-06-24 NOTE — PATIENT INSTRUCTIONS
Continue making lifestyle changes that focus on good nutrition, regular exercise and stress management.    Medication Plan: Finish up Zepbound 7.5 mg weekly pens and then increase to 10 mg weekly. Prescription at pharmacy. Alert me if any refills are needed.    Zepbound is being prescribed for the dual FDA indication for the treatment of Overweight/Obesity and Obstructive Sleep Apnea (DEEPTI).    Tips while taking an injectable medication:    Be an intuitive eater. Listen to your hunger and fullness signals, stopping when you are full.  Consume protein and produce in your day, striving for a rainbow of color of produce.  Reduce portions to staring size of 1 cup size and check in with your gut to see if you are full. Set the timer to slow down your eating pace to allow for 15-20 minutes to complete a meal. Recommend following the \"2 bite rule\".  Reduce refined sugars and high fat foods, as they may contribute to greater side effects of nausea and heartburn.  Stop eating 3 hours before bedtime to allow your food to digest.  Remain hydrated with water or non caloric and non caffeine beverages.  Use over the counter yeny lozenge/supplement to help reduce nausea if needed.  If you have been off your medication for more than 2 weeks please notify our office to determine next dosing, as return to previous dose may not be appropriate or tolerated.  Zepbound can be kept at room temperature for up to 3 weeks.      Next steps to work on before next visit include: Recommend keeping healthy snacks in a cooler in your card to support healthy, balanced nutrition with regular meals. Make a consult with one of our dieticians, Alvin Greenfield. Completed labs as ordered by your PCP.    Balanced Nutrition includes:     Build the mentality of Food 4 Fuel. Clean eating with whole foods and eliminating/reducing ultra processed foods.  Be an intuitive eater and using mindful eating practices.  Eat a balanced plate with protein and produce at  all meals: 1/4 plate- protein, 1/2 plate non starchy veggies, and 1/4 plate fruit or complex carbohydrate.  Drink water with all meals and use a salad plate to naturally reduce portions.  Eliminate/reduce late night eating by stopping after 7pm. Allowing your body to fast for 12 hours (drink only water, tea or black coffee without any additives).                  Staying Healthy While Traveling    by Lety Forrest RD    OAC at www.obesityaction.org Summer 2023 Resource    It’s time to go on a vacation! Summertime is a popular season for travel, and vacations are a wonderful way to unwind, discover new places, and spend time with family and friends. Taking a break from your daily routine is really important. When you return, you’ll feel refreshed and ready for the next things you need to do! As you pack your bags and plan your trip, remember to stay focused on your health and wellness goals. With a bit of planning, it’s not hard to stay active, eat well, and have a great time during your vacation.    Do Your Research and Plan Ahead  Keeping yourself on track during a vacation might feel like an impossible challenge. However, with the right preparation, it’s entirely achievable. Before you leave, take some time to plan your vacation. Look for ways to include health and nutrition in your itinerary, such as trying new activities and foods. You’ll be pleasantly surprised to find that many places have great options for staying healthy.    Check your Nutrition  Instead of constantly eating sweets, fried foods and junk, seek out healthier alternatives. Choosing nutritious foods while traveling has many advantages. First, eating healthy will give you more energy, which is important for having a fun vacation. Second, sticking to your plan will give you a sense of accomplishment and satisfaction. Here are some tips to help you choose wisely and prioritize your well-being.    Consider using grocery delivery services. You can  shop online from home and have meals and snacks delivered to your hotel. Just make sure your room has a refrigerator. This way, you won’t have to search for a store and can avoid the temptation of unhealthy vacation treats. You can stock up on fresh produce, cheese sticks, popcorn and protein bars to keep in your hotel room.    Make eating out fun by finding new restaurants to try at your destination. Take a moment to look up their menus online and come up with a plan. You can plan to try fresh fish at a local beach restaurant, visit a [employer] and choose a lean cut of meat with a side salad, or enjoy chicken fajitas at a Mexican restaurant.  Remember to give yourself a break. It’s okay to indulge in a special meal or snack during your vacation. One treat doesn’t mean the whole day is ruined. Just get back on track with your healthy eating at the next meal.    Finding Fitness  Discovering fun fitness activities can help you build a habit that you’ll want to continue. When you’re on vacation, make it a point to set aside time to move your body. This will not only increase your energy levels but also make you feel positive and satisfied about being active. Be creative and think outside the box to come up with exciting ideas to stay active during your vacation!    Try to find ways to move throughout the day. Look for trails in a local park, a gym at your hotel, or join an exercise class for the day. Keep things interesting by varying your activities and trying something new.  Involve the whole family in staying active. Rent bikes for an afternoon, plan a walk after dinner, or try something different like surfing! You can also let your kids choose an activity. You might be surprised to hear what ideas they have!    Mind Your Mental Health  Vacations are meant to be relaxing and a chance to recharge. Make sure you have a vacation that helps you rest and rejuvenate! Sometimes it’s easy to plan too many activities and  take on too many commitments. Take a moment to find balance between fun and fitness.    Pack items you enjoy. Bring your favorite books, puzzles, music, or whatever you love to ensure you have time to do just that. Sneaking away for a few minutes can give you time to spend on your favorite things.  Remember to rest. Vacations can be full of exciting things, but it’s also important to take it easy. Take an afternoon nap or find some quiet time alone to recharge.    Include activities that you enjoy. Vacations offer a lot of things to do, so make sure you have activities that you personally enjoy along with the rest of the family.    How to: Make it Through a Long Day of Travel  Whether you’re taking a road trip for a few hours or a plane across the [place] travel days require some planning. Here are some tips to make your trip easier.    Pack a snack bag for long road trips or airport days. When in the car, consider bringing a cooler with water to avoid sugary snacks at the gas stations. Pack fresh fruits, vegetables, water, cheese and meats. Include snacks like popcorn, pretzels or whole-grain crackers. Airline travel can be more challenging, but you can pack snacks such as protein bars, trail mix or popcorn in your carry-on. Bring a refillable water bottle to stay hydrated throughout the day!    Find ways to walk, even on your busiest travel day. If you’re driving, take quick 10-15-minute walks every couple of hours. It will not only give you some cardio exercise but also reduce stress and improve your mood during long car rides. If you’re stuck in an airport, walk up and down the terminal while you wait.  Bring items like books, adult coloring books or knitting supplies to keep busy and reduce screen time. This can be a great opportunity to explore a new hobby and let your creative juices flow.  How to: Make Healthy Food Choices When Eating Out  Making healthy food choices while eating out on vacation can be  challenging.    Instead of donuts and pastries for breakfast, go for high-protein items like eggs, turkey sausage, turkey ledesma, oats and whole-grain toast. Ask for fresh fruit or low-fat yogurt on the side to make breakfast even more nutritious.  Restaurant portions can be large. If someone in your group is willing to split a meal, that can be an easy solution. Since you might not have enough space to take leftovers with you, you may have to leave some behind.  When looking at the lunch or dinner menu, choose grilled, baked or boiled options. This can save you a lot of calories and fat. Be mindful of side dishes, as they can add up quickly. Consider swapping a high-calorie side item for a side salad, fruit or vegetables.    Desserts can be tempting, but plan ahead if you want to order one. Choose a lower-calorie meal to balance it out. You can also share the dessert with others at your table to enjoy a smaller portion.  How to: Deal With an unexpected change of plans  We’ve all been there. Your perfectly planned vacation goes sideways. How do you make the most of these days? It can be easy to throw in the towel, but always be ready with a backup plan.    The weather can change unexpectedly. A rainy day might ruin your beach or mountain hike plans. Take on the challenge and find new ways to have fun. Look for an indoor pool or an activity center to keep working towards your goals!    Your carefully chosen restaurant may not have healthy options, or your grocery store delivery might be late. Don’t give up; just adapt and make a new decision. Make the best choice you can and move on to the next meal. It’s okay if it’s not perfect!    Sometimes your perfectly planned day doesn’t go as expected. Kids may start fighting during a trip to the park, or someone might get sick on vacation. Focus on the positive moments and enjoy the time you have.  Wherever you go on your summer vacation, make sure to enjoy the break,  relaxation and adventure. Taking time away from your usual routine can be amazing, and keeping up with your health and fitness goals can make your vacation even better.

## 2025-06-26 ENCOUNTER — OFFICE VISIT (OUTPATIENT)
Dept: PHYSICAL THERAPY | Age: 30
End: 2025-06-26
Attending: FAMILY MEDICINE
Payer: COMMERCIAL

## 2025-06-26 PROCEDURE — 97110 THERAPEUTIC EXERCISES: CPT

## 2025-06-26 PROCEDURE — 97140 MANUAL THERAPY 1/> REGIONS: CPT
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
## 2025-07-02 DIAGNOSIS — R73.03 PREDIABETES: ICD-10-CM

## 2025-07-02 DIAGNOSIS — G47.33 OSA (OBSTRUCTIVE SLEEP APNEA): ICD-10-CM

## 2025-07-02 DIAGNOSIS — Z51.81 ENCOUNTER FOR THERAPEUTIC DRUG MONITORING: ICD-10-CM

## 2025-07-02 DIAGNOSIS — E66.813 CLASS 3 SEVERE OBESITY WITH SERIOUS COMORBIDITY AND BODY MASS INDEX (BMI) OF 45.0 TO 49.9 IN ADULT, UNSPECIFIED OBESITY TYPE: ICD-10-CM

## 2025-07-07 RX ORDER — TIRZEPATIDE 7.5 MG/.5ML
7.5 INJECTION, SOLUTION SUBCUTANEOUS WEEKLY
Qty: 2 ML | Refills: 0 | OUTPATIENT
Start: 2025-07-07

## 2025-07-07 NOTE — TELEPHONE ENCOUNTER
Requesting zepbound 7.5  LOV: televisit 6/24/25 LOV 2/19/25      Future Appointments   Date Time Provider Department Center   7/9/2025  5:15 PM Stefani Santos PT  [facility] [place]   7/11/2025 10:45 AM Stefani Santos PT  PHYS [facility] [place]   7/14/2025 10:15 AM Stefani Santos PT  PHYS [facility] [place]   7/24/2025  8:00 PM  SLEEP ROOMS [facility]   9/4/2025 11:00 AM Shelley Jaeger APRN [facility]   12/10/2025 10:00 AM Shelley Jaeger APRN [facility]     Denied. Patient should be taking zepbound 10mg

## 2025-07-09 ENCOUNTER — APPOINTMENT (OUTPATIENT)
Dept: PHYSICAL THERAPY | Age: 30
End: 2025-07-09
Attending: FAMILY MEDICINE
Payer: COMMERCIAL

## 2025-07-11 ENCOUNTER — OFFICE VISIT (OUTPATIENT)
Dept: PHYSICAL THERAPY | Age: 30
End: 2025-07-11
Attending: FAMILY MEDICINE
Payer: COMMERCIAL

## 2025-07-11 PROCEDURE — 97112 NEUROMUSCULAR REEDUCATION: CPT

## 2025-07-11 PROCEDURE — 97110 THERAPEUTIC EXERCISES: CPT
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
## 2025-07-14 ENCOUNTER — OFFICE VISIT (OUTPATIENT)
Dept: PHYSICAL THERAPY | Age: 30
End: 2025-07-14
Attending: FAMILY MEDICINE
Payer: COMMERCIAL

## 2025-07-14 ENCOUNTER — TELEPHONE (OUTPATIENT)
Dept: FAMILY MEDICINE CLINIC | Facility: CLINIC | Age: 30
End: 2025-07-14

## 2025-07-14 PROCEDURE — 97110 THERAPEUTIC EXERCISES: CPT

## 2025-07-14 NOTE — TELEPHONE ENCOUNTER
Spoke to patient.   States she has had intermittent migraines for past 2 weeks.   For the past week she has now had a constant migraine.   Pain is across forehead, behind eyes, and temple area. Feels constant pressure. Denies blurry vision, numbness/tingling.  Has been taking ibuprofen and acetaminophen for pain which helps a little but does not last.   Has woken up from the pain.   Also has had constant swelling for ankles/feet for past month. Thinks it may be related to her rheumatoid arthritis and heat.   Swelling was better today and yesterday but patient states she did not go outside.   Has been pushing water intake.   Advised patient to continue to monitor sx. Go to ER if pain worsens. Push water intake/avoid salt. Scheduled appointment for tomorrow.   Future Appointments   Date Time Provider Department Center   7/15/2025 10:00 AM Alexa Tucker DO [facility] [address] [facility] [address]   7/24/2025  8:00 PM [facility] SLEEP [facility]   7/31/2025  7:00 AM Stefani Santos, PT [facility]   9/4/2025 11:00 AM Shelley Jaeger APRN [facility] [address]   12/10/2025 10:00 AM Shelley Jaeger APRN [facility] [address]        Patient verbalized understanding and thankful for call.

## 2025-07-14 NOTE — TELEPHONE ENCOUNTER
Appointment Request From: Pat Leiva      With Provider: Alexa Tucker [[facility], [address] [place] [address]]      Preferred Date Range: 7/14/2025 - 7/18/2025      Preferred Times: Any      Reason for visit: Primary Care - Video Visit      Health Maintenance Topic:      Comments:   I am having severe migraines. They are unbearable. Along with severely swollen ankles and feet.

## 2025-07-14 NOTE — PROGRESS NOTES
Subjective:   Pat Leiva is a 29 year old female who presents for Migraine (Migraine headaches and feet and ankle swelling x 2 wks)       History/Other:   History of Present Illness      Pat Leiva is a 29 year old female with migraines and sleep apnea who presents with bilateral ankle swelling and worsening migraines.    She has been experiencing bilateral ankle swelling for the past three weeks, which she associates with her rheumatoid arthritis. The swelling typically worsens in hot weather. There is no recent travel, history of blood clots, chest pain, or shortness of breath. Her mother has a history of blood clots. She has not noticed any unilateral swelling.    She has been experiencing worsening migraines for the past two weeks, severe enough to wake her from sleep despite using a CPAP machine. The migraines are located in the temples, across the front, and around her eyes, with occasional swelling of the eyes. The migraines began after her  in , associated with high blood pressure, and resolved after her blood pressure normalized. They recurred after her sleep apnea diagnosis this year. She has been using ibuprofen 800 mg and acetaminophen for relief, but these have not been effective. She previously used topiramate for migraine prevention, which was stopped two weeks ago when she started on Zepbound for weight management. The cessation of topiramate coincided with the return of her migraines.    She reports occasional nausea, which she attributes to Zepbound, and constipation. No visual disturbances, aura, or vomiting. She has not experienced any ringing in the ears except during severe migraines.    Her current medications include ibuprofen 800 mg, acetaminophen, and she has a supply of topiramate at home. She recently stopped taking topiramate and phentermine two weeks ago upon starting Zepbound. She has been using a CPAP machine for sleep apnea, which initially helped with her  headaches but has been less effective recently.  She has PCOS.    Obesity- on zepbound.     DEEPTI on cpap     Last A1c value was 5.5% done 1/13/2025.    Anxiety- lexapro     CT brain negative 2024.     Hx of RA- diagnosed in 2014. She is on methotrexate, prednsione.  She was having some mild joint pains. She reports her joint pains occur seasonally. She has a current Rheumatologist.       Chief Complaint Reviewed and Verified  Nursing Notes Reviewed and   Verified  Tobacco Reviewed  Allergies Reviewed  Medications Reviewed    Problem List Reviewed  Medical History Reviewed  Surgical History   Reviewed  OB Status Reviewed  Family History Reviewed         Tobacco:  She smoked tobacco in the past but quit greater than 12 months ago.  Tobacco Use[1]     Current Medications[2]           Review of Systems:  Pertinent items are noted in HPI.      Objective:   /70   Pulse 94   Temp 97.5 °F (36.4 °C) (Temporal)   Resp 16   Ht 5' 7\" (1.702 m)   Wt (!) 318 lb (144.2 kg)   LMP 05/15/2025 (Approximate)   SpO2 99%   BMI 49.81 kg/m²  Estimated body mass index is 49.81 kg/m² as calculated from the following:    Height as of this encounter: 5' 7\" (1.702 m).    Weight as of this encounter: 318 lb (144.2 kg).  Results  LABS    CBC: Anemia (decreased hemoglobin)     Physical Exam  GENERAL: Alert, cooperative, well developed, no acute distress  HEENT: Normocephalic, normal oropharynx, moist mucous membranes  CHEST: Clear to auscultation bilaterally, no wheezes, rhonchi, or crackles  CARDIOVASCULAR: Normal heart rate and rhythm, S1 and S2 normal without murmurs  ABDOMEN: Soft, non-tender, non-distended, without organomegaly, normal bowel sounds  EXTREMITIES: No cyanosis or edema  NEUROLOGICAL: Cranial nerves grossly intact, moves all extremities without gross motor or sensory deficit      Assessment & Plan:   1. Chronic migraine without aura, with intractable migraine, so stated, with status migrainosus (Primary)  -      SUMAtriptan Succinate; Take 1 tablet (50 mg total) by mouth every 2 (two) hours as needed for Migraine (do not take more than 2 pills in 24 hours.).  Dispense: 9 tablet; Refill: 0  2. Low hemoglobin  -     CBC With Differential With Platelet; Future; Expected date: 07/15/2025  -     Iron And Tibc; Future; Expected date: 07/15/2025  -     Ferritin; Future; Expected date: 07/15/2025  3. SUSANA (generalized anxiety disorder)  4. Morbid obesity (HCC)  5. Rheumatoid arthritis involving right wrist with negative rheumatoid factor (HCC)  6. DEEPTI (obstructive sleep apnea)  7. Bilateral edema of lower extremity  -     Furosemide; Take 1 tablet (20 mg total) by mouth daily as needed.  Dispense: 15 tablet; Refill: 0  -     Basic Metabolic Panel (8); Future; Expected date: 07/15/2025    Assessment & Plan  Migraine  Worsening migraines likely due to discontinuation of topiramate. Nausea possibly related to Zepbound.  - Resume Topamax 20 mg daily, monitor appetite and weight.  - Prescribe sumatriptan for abortive treatment, max two pills in 24 hours.  - Follow up in six weeks or update via [email or web address].  - I sent [facility] provider message about Topamax resumption.     Sleep Apnea  CPAP use initially improved sleep. Migraines currently affecting sleep quality.  - Continue CPAP use.  - Address migraines to improve sleep.    Peripheral Edema  Bilateral swelling likely due to water retention, not a blood clot.  - Prescribe diuretic as needed, monitor electrolytes.  - Advise compression socks, leg elevation, reduced salt intake.  - Recommend weight loss, avoid sodas, carbonation, teas.    Anemia  Low hemoglobin, previous CBC showed decreased levels. Need to monitor for worsening.  - Order CBC and ferritin tests.  - Encourage iron-rich diet: red meat, leafy greens, lentils.        Return in about 4 weeks (around 8/12/2025) for migraines.        Alexa Tucker DO, 7/14/2025, 2:32 PM           The following individual(s) verbally consented to be  recorded using ambient AI listening technology and understand that they can each withdraw their consent to this listening technology at any point by asking the clinician to turn off or pause the recording:    Patient name: Pat Leiva      Alexa Tucker,         This note was prepared using Dragon Medical voice recognition dictation software. As a result errors may occur. When identified these errors have been corrected. While every attempt is made to correct errors during dictation discrepancies may still exist.      Note to patient: The 21st Century Cures Act makes medical notes like these available to patients in the interest of transparency. However, be advised this is a medical document. It is intended as peer to peer communication. It is written in medical language and may contain abbreviations or verbiage that are unfamiliar. It may appear blunt or direct. Medical documents are intended to carry relevant information, facts as evident, and the clinical opinion of the practitioner.               [1]   Social History  Tobacco Use   Smoking Status Never    Passive exposure: Never   Smokeless Tobacco Former   [2]   Current Outpatient Medications   Medication Sig Dispense Refill    furosemide 20 MG Oral Tab Take 1 tablet (20 mg total) by mouth daily as needed. 15 tablet 0    SUMAtriptan 50 MG Oral Tab Take 1 tablet (50 mg total) by mouth every 2 (two) hours as needed for Migraine (do not take more than 2 pills in 24 hours.). 9 tablet 0    methotrexate 2.5 MG Oral Tab Take 5 tablets of Methotrexate 2.5 mg once weekly with food. 20 tablet 2    predniSONE 5 MG Oral Tab Take 1 tablet (5 mg total) by mouth daily as needed. Take Prednisone 5 mg daily as needed for pain in the morning with food. 90 tablet 1    cyclobenzaprine 10 MG Oral Tab Take 1 tablet (10 mg total) by mouth nightly as needed for Muscle spasms. 15 tablet 0    Tirzepatide-Weight Management (ZEPBOUND) 10 MG/0.5ML Subcutaneous Solution  Auto-injector Inject 10 mg into the skin once a week. Start after completing full 4 weeks on Zepbound 7.5 mg weekly dose. 2 mL 0    Turmeric (QC TUMERIC COMPLEX OR) Take by mouth.      DHA-EPA-Vitamin E (OMEGA-3 COMPLEX OR) Take by mouth.      ibuprofen 800 MG Oral Tab Take 1 tablet (800 mg total) by mouth every 8 (eight) hours as needed for Pain. 90 tablet 2    escitalopram 10 MG Oral Tab Take 1 tablet (10 mg total) by mouth every morning. 90 tablet 1    Cyanocobalamin (VITAMIN B 12 OR) Take by mouth.      folic acid 1 MG Oral Tab Take 1 tablet (1 mg total) by mouth daily. 90 tablet 2    TOPIRAMATE 25 MG Oral Tab TAKE 1 TABLET(25 MG) BY MOUTH TWICE DAILY. START 1 TABLET DAILY FOR 7 DAYS, THEN CAN. INCREASE TO 2 TIMES A DAY AS DIRECTED (Patient not taking: Reported on 7/15/2025) 180 tablet 0
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
## 2025-07-15 ENCOUNTER — OFFICE VISIT (OUTPATIENT)
Dept: FAMILY MEDICINE CLINIC | Facility: CLINIC | Age: 30
End: 2025-07-15
Payer: COMMERCIAL

## 2025-07-15 VITALS
WEIGHT: 293 LBS | HEIGHT: 67 IN | HEART RATE: 94 BPM | BODY MASS INDEX: 45.99 KG/M2 | OXYGEN SATURATION: 99 % | DIASTOLIC BLOOD PRESSURE: 70 MMHG | SYSTOLIC BLOOD PRESSURE: 110 MMHG | RESPIRATION RATE: 16 BRPM | TEMPERATURE: 98 F

## 2025-07-15 DIAGNOSIS — E66.01 MORBID OBESITY (HCC): ICD-10-CM

## 2025-07-15 DIAGNOSIS — F41.1 GAD (GENERALIZED ANXIETY DISORDER): ICD-10-CM

## 2025-07-15 DIAGNOSIS — M06.031 RHEUMATOID ARTHRITIS INVOLVING RIGHT WRIST WITH NEGATIVE RHEUMATOID FACTOR (HCC): ICD-10-CM

## 2025-07-15 DIAGNOSIS — R60.0 BILATERAL EDEMA OF LOWER EXTREMITY: ICD-10-CM

## 2025-07-15 DIAGNOSIS — G43.711 CHRONIC MIGRAINE WITHOUT AURA, WITH INTRACTABLE MIGRAINE, SO STATED, WITH STATUS MIGRAINOSUS: Primary | ICD-10-CM

## 2025-07-15 DIAGNOSIS — G47.33 OSA (OBSTRUCTIVE SLEEP APNEA): ICD-10-CM

## 2025-07-15 DIAGNOSIS — D64.9 LOW HEMOGLOBIN: ICD-10-CM

## 2025-07-15 PROCEDURE — 3074F SYST BP LT 130 MM HG: CPT | Performed by: FAMILY MEDICINE

## 2025-07-15 PROCEDURE — 3078F DIAST BP <80 MM HG: CPT | Performed by: FAMILY MEDICINE

## 2025-07-15 PROCEDURE — 3008F BODY MASS INDEX DOCD: CPT | Performed by: FAMILY MEDICINE

## 2025-07-15 PROCEDURE — 99214 OFFICE O/P EST MOD 30 MIN: CPT | Performed by: FAMILY MEDICINE

## 2025-07-15 RX ORDER — FUROSEMIDE 20 MG/1
20 TABLET ORAL DAILY PRN
Qty: 15 TABLET | Refills: 0 | Status: SHIPPED | OUTPATIENT
Start: 2025-07-15

## 2025-07-15 RX ORDER — SUMATRIPTAN 50 MG/1
50 TABLET, FILM COATED ORAL EVERY 2 HOUR PRN
Qty: 9 TABLET | Refills: 0 | Status: SHIPPED | OUTPATIENT
Start: 2025-07-15 | End: 2025-07-24

## 2025-07-15 NOTE — PATIENT INSTRUCTIONS
VISIT SUMMARY:  Today, you were seen for bilateral ankle swelling and worsening migraines. We discussed your symptoms, including the history of your migraines and the recent changes in your medications. We also reviewed your sleep apnea and its impact on your migraines and sleep quality. Additionally, we addressed your ankle swelling and discussed potential causes and treatments.    YOUR PLAN:  -MIGRAINE: Your migraines have worsened likely due to stopping Topamax, a medication that helps prevent migraines. We will resume Topamax at 20 mg daily and monitor your appetite and weight. Sumatriptan has been prescribed for immediate relief, with a maximum of two pills in 24 hours. Please follow up in six weeks or update us via [employer]. We will also communicate with your weight loss specialist about restarting Topamax.    -SLEEP APNEA: Sleep apnea is a condition where your breathing stops and starts during sleep. You should continue using your CPAP machine as it initially helped with your headaches. Addressing your migraines should also help improve your sleep quality.    -PERIPHERAL EDEMA: Peripheral edema is swelling caused by fluid retention, often in the legs and ankles. We believe your bilateral ankle swelling is due to water retention and not a blood clot. A diuretic has been prescribed to help reduce the swelling, and you should monitor your electrolytes. We recommend wearing compression socks, elevating your legs, and reducing your salt intake. Additionally, weight loss and avoiding sodas, carbonation, and teas can help.    -ANEMIA: Anemia is a condition where you don't have enough healthy red blood cells to carry adequate oxygen to your body's tissues. Your previous blood tests showed low hemoglobin levels. We will order new blood tests (CBC and ferritin) to monitor this. In the meantime, try to eat iron-rich foods like red meat, leafy greens, and lentils.    INSTRUCTIONS:  Please follow up in six weeks or update  us via [employer] regarding your migraines. We will also need to monitor your blood tests for anemia. If you have any new or worsening symptoms, please contact us immediately.    Contains text generated by Anne

## 2025-07-16 ENCOUNTER — PATIENT MESSAGE (OUTPATIENT)
Dept: FAMILY MEDICINE CLINIC | Facility: CLINIC | Age: 30
End: 2025-07-16

## 2025-07-25 ENCOUNTER — OFFICE VISIT (OUTPATIENT)
Dept: FAMILY MEDICINE CLINIC | Facility: CLINIC | Age: 30
End: 2025-07-25
Payer: COMMERCIAL

## 2025-07-25 ENCOUNTER — LAB ENCOUNTER (OUTPATIENT)
Dept: LAB | Age: 30
End: 2025-07-25
Attending: FAMILY MEDICINE
Payer: COMMERCIAL

## 2025-07-25 VITALS
RESPIRATION RATE: 16 BRPM | DIASTOLIC BLOOD PRESSURE: 72 MMHG | HEIGHT: 67 IN | SYSTOLIC BLOOD PRESSURE: 122 MMHG | TEMPERATURE: 97 F | BODY MASS INDEX: 45.99 KG/M2 | HEART RATE: 61 BPM | WEIGHT: 293 LBS | OXYGEN SATURATION: 98 %

## 2025-07-25 DIAGNOSIS — G43.709 CHRONIC MIGRAINE WITHOUT AURA WITHOUT STATUS MIGRAINOSUS, NOT INTRACTABLE: ICD-10-CM

## 2025-07-25 DIAGNOSIS — E28.2 PCOS (POLYCYSTIC OVARIAN SYNDROME): ICD-10-CM

## 2025-07-25 DIAGNOSIS — G47.19 DAYTIME HYPERSOMNOLENCE: ICD-10-CM

## 2025-07-25 DIAGNOSIS — F41.1 GAD (GENERALIZED ANXIETY DISORDER): ICD-10-CM

## 2025-07-25 DIAGNOSIS — J30.81 ALLERGIC REACTION TO ANIMAL: ICD-10-CM

## 2025-07-25 DIAGNOSIS — M06.00 SERONEGATIVE RHEUMATOID ARTHRITIS (HCC): ICD-10-CM

## 2025-07-25 DIAGNOSIS — D64.9 LOW HEMOGLOBIN: Primary | ICD-10-CM

## 2025-07-25 DIAGNOSIS — D64.9 LOW HEMOGLOBIN: ICD-10-CM

## 2025-07-25 DIAGNOSIS — N62 LARGE BREASTS: ICD-10-CM

## 2025-07-25 DIAGNOSIS — R60.0 BILATERAL EDEMA OF LOWER EXTREMITY: ICD-10-CM

## 2025-07-25 DIAGNOSIS — Z30.42 DEPO-PROVERA CONTRACEPTIVE STATUS: ICD-10-CM

## 2025-07-25 DIAGNOSIS — E66.01 MORBID OBESITY (HCC): ICD-10-CM

## 2025-07-25 DIAGNOSIS — Z79.899 ENCOUNTER FOR LONG-TERM (CURRENT) USE OF HIGH-RISK MEDICATION: ICD-10-CM

## 2025-07-25 DIAGNOSIS — G47.33 OSA (OBSTRUCTIVE SLEEP APNEA): ICD-10-CM

## 2025-07-25 LAB
ALT SERPL-CCNC: 23 U/L (ref 10–49)
ANION GAP SERPL CALC-SCNC: 7 MMOL/L (ref 0–18)
AST SERPL-CCNC: 20 U/L (ref ?–34)
BASOPHILS # BLD AUTO: 0.04 X10(3) UL (ref 0–0.2)
BASOPHILS NFR BLD AUTO: 0.7 %
BUN BLD-MCNC: 10 MG/DL (ref 9–23)
CALCIUM BLD-MCNC: 8.2 MG/DL (ref 8.7–10.6)
CHLORIDE SERPL-SCNC: 111 MMOL/L (ref 98–112)
CO2 SERPL-SCNC: 25 MMOL/L (ref 21–32)
CREAT BLD-MCNC: 0.71 MG/DL (ref 0.55–1.02)
CRP SERPL-MCNC: <0.5 MG/DL (ref ?–0.5)
DEPRECATED HBV CORE AB SER IA-ACNC: 84 NG/ML (ref 50–306)
EGFRCR SERPLBLD CKD-EPI 2021: 119 ML/MIN/1.73M2 (ref 60–?)
EOSINOPHIL # BLD AUTO: 0.02 X10(3) UL (ref 0–0.7)
EOSINOPHIL NFR BLD AUTO: 0.4 %
ERYTHROCYTE [DISTWIDTH] IN BLOOD BY AUTOMATED COUNT: 15.5 %
ERYTHROCYTE [SEDIMENTATION RATE] IN BLOOD: 71 MM/HR (ref 0–20)
FASTING STATUS PATIENT QL REPORTED: NO
GLUCOSE BLD-MCNC: 84 MG/DL (ref 70–99)
HCT VFR BLD AUTO: 34 % (ref 35–48)
HGB BLD-MCNC: 11 G/DL (ref 12–16)
IMM GRANULOCYTES # BLD AUTO: 0.01 X10(3) UL (ref 0–1)
IMM GRANULOCYTES NFR BLD: 0.2 %
IRON SATN MFR SERPL: 15 % (ref 15–50)
IRON SERPL-MCNC: 29 UG/DL (ref 50–170)
LYMPHOCYTES # BLD AUTO: 2.12 X10(3) UL (ref 1–4)
LYMPHOCYTES NFR BLD AUTO: 38.1 %
MCH RBC QN AUTO: 27 PG (ref 26–34)
MCHC RBC AUTO-ENTMCNC: 32.4 G/DL (ref 31–37)
MCV RBC AUTO: 83.5 FL (ref 80–100)
MONOCYTES # BLD AUTO: 0.22 X10(3) UL (ref 0.1–1)
MONOCYTES NFR BLD AUTO: 3.9 %
NEUTROPHILS # BLD AUTO: 3.16 X10 (3) UL (ref 1.5–7.7)
NEUTROPHILS # BLD AUTO: 3.16 X10(3) UL (ref 1.5–7.7)
NEUTROPHILS NFR BLD AUTO: 56.7 %
OSMOLALITY SERPL CALC.SUM OF ELEC: 294 MOSM/KG (ref 275–295)
PLATELET # BLD AUTO: 437 10(3)UL (ref 150–450)
POTASSIUM SERPL-SCNC: 3.8 MMOL/L (ref 3.5–5.1)
RBC # BLD AUTO: 4.07 X10(6)UL (ref 3.8–5.3)
SODIUM SERPL-SCNC: 143 MMOL/L (ref 136–145)
TOTAL IRON BINDING CAPACITY: 200 UG/DL (ref 250–425)
TRANSFERRIN SERPL-MCNC: 136 MG/DL (ref 250–380)
WBC # BLD AUTO: 5.6 X10(3) UL (ref 4–11)

## 2025-07-25 PROCEDURE — 99214 OFFICE O/P EST MOD 30 MIN: CPT | Performed by: FAMILY MEDICINE

## 2025-07-25 PROCEDURE — 3008F BODY MASS INDEX DOCD: CPT | Performed by: FAMILY MEDICINE

## 2025-07-25 PROCEDURE — 85652 RBC SED RATE AUTOMATED: CPT

## 2025-07-25 PROCEDURE — 84460 ALANINE AMINO (ALT) (SGPT): CPT

## 2025-07-25 PROCEDURE — 36415 COLL VENOUS BLD VENIPUNCTURE: CPT

## 2025-07-25 PROCEDURE — 83550 IRON BINDING TEST: CPT

## 2025-07-25 PROCEDURE — 83540 ASSAY OF IRON: CPT

## 2025-07-25 PROCEDURE — 82728 ASSAY OF FERRITIN: CPT

## 2025-07-25 PROCEDURE — 80048 BASIC METABOLIC PNL TOTAL CA: CPT

## 2025-07-25 PROCEDURE — 3074F SYST BP LT 130 MM HG: CPT | Performed by: FAMILY MEDICINE

## 2025-07-25 PROCEDURE — 84450 TRANSFERASE (AST) (SGOT): CPT

## 2025-07-25 PROCEDURE — 86140 C-REACTIVE PROTEIN: CPT

## 2025-07-25 PROCEDURE — 3078F DIAST BP <80 MM HG: CPT | Performed by: FAMILY MEDICINE

## 2025-07-25 PROCEDURE — 85025 COMPLETE CBC W/AUTO DIFF WBC: CPT

## 2025-07-25 RX ORDER — EPINEPHRINE 0.3 MG/.3ML
0.3 INJECTION SUBCUTANEOUS AS NEEDED
Qty: 2 EACH | Refills: 0 | Status: SHIPPED | OUTPATIENT
Start: 2025-07-25 | End: 2026-07-25

## 2025-07-25 NOTE — PROGRESS NOTES
Subjective:   Pat Leiva is a 29 year old female who presents for Allergies (Patient state she went into a client's home that had cats - swelling in ankle and face. Would like a note stating she cannot be in a house with cats )       History/Other:   History of Present Illness    Leg swelling- on lasix    Migraines- last vist started topamax     Methotrexate for RA     Zepbound for WL seen by  [facility]        Pt on lexapro 10mg per day  for anxiety  DEEPTI on cpap     Seeing C.     She sees her therpaist regularly weekly   She works at [employer].        Pat Leiva is a 29 year old female with cat allergies who presents with an allergic reaction following exposure to a cat.    She experienced a severe allergic reaction after visiting a client's home for a home inspection where a cat was present. She felt lightheaded, experienced throat tightness, and had difficulty breathing. Additionally, she reported feeling something sharp on her ankle and saw the cat running away, but did not see any visible injury. Later that night, she noticed significant swelling in her foot, described as 'elephant foot,' and facial swelling, including her eyes and lips. She took Benadryl, which helped alleviate the symptoms by the next evening. She manages her cat allergies with Benadryl and wearing a mask, but this incident was more severe than previous exposures.    She has a history of migraines, which have been persistent despite taking Topamax. She experiences nausea and weakness during these episodes and has been relying on Excedrin Extra Strength for relief. The migraines have just started within the last month and have been occurring frequently.    She mentions her arthritis, which sometimes causes her to wake up in pain, particularly in hot weather.    She is currently using a CPAP machine for sleep apnea and has a known allergy to nefazoline and glycerin.   Chief Complaint Reviewed and Verified  Nursing Notes Reviewed and    Verified  Tobacco Reviewed  Allergies Reviewed  Medications Reviewed    Medical History Reviewed  Surgical History Reviewed  Family History   Reviewed  Social History Reviewed         Tobacco:  She smoked tobacco in the past but quit greater than 12 months ago.  Tobacco Use[1]     Current Medications[2]      Review of Systems:  Pertinent items are noted in HPI.      Objective:   /72   Pulse 61   Temp 97.4 °F (36.3 °C) (Temporal)   Resp 16   Ht 5' 7\" (1.702 m)   Wt (!) 301 lb (136.5 kg)   LMP 07/11/2025 (Approximate)   SpO2 98%   BMI 47.14 kg/m²  Estimated body mass index is 47.14 kg/m² as calculated from the following:    Height as of this encounter: 5' 7\" (1.702 m).    Weight as of this encounter: 301 lb (136.5 kg).  Results       Physical Exam  GENERAL: Alert, cooperative, well developed, no acute distress.  HEENT: Normocephalic, normal oropharynx, moist mucous membranes, pharynx normal.  CHEST: Clear to auscultation bilaterally, no wheezes, rhonchi, or crackles.  CARDIOVASCULAR: Normal heart rate and rhythm, S1 and S2 normal without murmurs.  ABDOMEN: Soft, non-tender, non-distended, without organomegaly, normal bowel sounds.  EXTREMITIES: No cyanosis or edema.  NEUROLOGICAL: Cranial nerves grossly intact, moves all extremities without gross motor or sensory deficit.      Assessment & Plan:   1. Low hemoglobin (Primary)  2. SUSANA (generalized anxiety disorder)  3. DEEPTI (obstructive sleep apnea)  4. Morbid obesity (HCC)  5. Bilateral edema of lower extremity  6. PCOS (polycystic ovarian syndrome)  7. Large breasts  8. Depo-Provera contraceptive status  9. Daytime hypersomnolence  10. Allergic reaction to animal  -     EPINEPHrine; Inject 0.3 mL (1 each total) as directed as needed.  Dispense: 2 each; Refill: 0  11. Chronic migraine without aura without status migrainosus, not intractable  -     MRI BRAIN (CPT=70551); Future; Expected date: 07/25/2025    Assessment & Plan  Allergic reaction to  cats  Severe allergic reaction to cats with risk for anaphylaxis. Resolved with diphenhydramine.  - Advise avoidance of environments with cats.  - Provide documentation for HR to prevent exposure during home inspections.  - Prescribe an EpiPen for emergency use.  - Instruct on the use of diphenhydramine or cetirizine post-exposure.    Chronic migraine without aura  Persistent migraines despite topiramate, requiring Excedrin. MRI needed to rule out other causes ie mass.  - Order MRI of the brain.    Rheumatoid arthritis  Inquired about FMLA for arthritis-related pain.  - Coordinate with forms department to assess FMLA eligibility based on rheumatology notes.    Obstructive sleep apnea  Adheres to CPAP therapy.    General Health Maintenance  - Encourage continued CPAP use.  - Advise on allergy management strategies.        No follow-ups on file.        Alexa Tucker DO, 7/25/2025, 12:30 AM           The following individual(s) verbally consented to be recorded using ambient AI listening technology and understand that they can each withdraw their consent to this listening technology at any point by asking the clinician to turn off or pause the recording:    Patient name: Pat Leiva  Alexa Tucker DO        This note was prepared using Dragon Medical voice recognition dictation software. As a result errors may occur. When identified these errors have been corrected. While every attempt is made to correct errors during dictation discrepancies may still exist.      Note to patient: The 21st Century Cures Act makes medical notes like these available to patients in the interest of transparency. However, be advised this is a medical document. It is intended as peer to peer communication. It is written in medical language and may contain abbreviations or verbiage that are unfamiliar. It may appear blunt or direct. Medical documents are intended to carry relevant information, facts as evident, and the clinical  opinion of the practitioner.               [1]   Social History  Tobacco Use   Smoking Status Never    Passive exposure: Never   Smokeless Tobacco Former   [2]   Current Outpatient Medications   Medication Sig Dispense Refill    EPINEPHrine (EPIPEN 2-DAO) 0.3 MG/0.3ML Injection Solution Auto-injector Inject 0.3 mL (1 each total) as directed as needed. 2 each 0    furosemide 20 MG Oral Tab Take 1 tablet (20 mg total) by mouth daily as needed. 15 tablet 0    TOPIRAMATE 25 MG Oral Tab TAKE 1 TABLET(25 MG) BY MOUTH TWICE DAILY. START 1 TABLET DAILY FOR 7 DAYS, THEN CAN. INCREASE TO 2 TIMES A DAY AS DIRECTED 180 tablet 0    methotrexate 2.5 MG Oral Tab Take 5 tablets of Methotrexate 2.5 mg once weekly with food. 20 tablet 2    predniSONE 5 MG Oral Tab Take 1 tablet (5 mg total) by mouth daily as needed. Take Prednisone 5 mg daily as needed for pain in the morning with food. 90 tablet 1    cyclobenzaprine 10 MG Oral Tab Take 1 tablet (10 mg total) by mouth nightly as needed for Muscle spasms. 15 tablet 0    Tirzepatide-Weight Management (ZEPBOUND) 10 MG/0.5ML Subcutaneous Solution Auto-injector Inject 10 mg into the skin once a week. Start after completing full 4 weeks on Zepbound 7.5 mg weekly dose. 2 mL 0    Turmeric (QC TUMERIC COMPLEX OR) Take by mouth.      DHA-EPA-Vitamin E (OMEGA-3 COMPLEX OR) Take by mouth.      ibuprofen 800 MG Oral Tab Take 1 tablet (800 mg total) by mouth every 8 (eight) hours as needed for Pain. 90 tablet 2    escitalopram 10 MG Oral Tab Take 1 tablet (10 mg total) by mouth every morning. 90 tablet 1    Cyanocobalamin (VITAMIN B 12 OR) Take by mouth.      folic acid 1 MG Oral Tab Take 1 tablet (1 mg total) by mouth daily. 90 tablet 2

## 2025-07-31 ENCOUNTER — APPOINTMENT (OUTPATIENT)
Dept: PHYSICAL THERAPY | Age: 30
End: 2025-07-31
Attending: FAMILY MEDICINE
Payer: COMMERCIAL

## 2025-07-31 ENCOUNTER — RESULTS FOLLOW-UP (OUTPATIENT)
Dept: FAMILY MEDICINE CLINIC | Facility: CLINIC | Age: 30
End: 2025-07-31

## 2025-07-31 DIAGNOSIS — D64.9 LOW HEMOGLOBIN: Primary | ICD-10-CM

## 2025-08-12 ENCOUNTER — PATIENT MESSAGE (OUTPATIENT)
Dept: INTERNAL MEDICINE CLINIC | Facility: CLINIC | Age: 30
End: 2025-08-12

## 2025-08-15 ENCOUNTER — PATIENT MESSAGE (OUTPATIENT)
Dept: FAMILY MEDICINE CLINIC | Facility: CLINIC | Age: 30
End: 2025-08-15

## 2025-08-18 ENCOUNTER — APPOINTMENT (OUTPATIENT)
Dept: CT IMAGING | Age: 30
End: 2025-08-18
Attending: EMERGENCY MEDICINE

## 2025-08-18 ENCOUNTER — APPOINTMENT (OUTPATIENT)
Dept: GENERAL RADIOLOGY | Age: 30
End: 2025-08-18
Attending: EMERGENCY MEDICINE

## 2025-08-18 ENCOUNTER — HOSPITAL ENCOUNTER (INPATIENT)
Facility: HOSPITAL | Age: 30
LOS: 1 days | Discharge: HOME | End: 2025-08-20
Attending: EMERGENCY MEDICINE | Admitting: STUDENT IN AN ORGANIZED HEALTH CARE EDUCATION/TRAINING PROGRAM

## 2025-08-18 ENCOUNTER — APPOINTMENT (OUTPATIENT)
Dept: ULTRASOUND IMAGING | Age: 30
End: 2025-08-18
Attending: EMERGENCY MEDICINE

## 2025-08-18 DIAGNOSIS — R60.0 LOWER EXTREMITY EDEMA: ICD-10-CM

## 2025-08-18 DIAGNOSIS — R55 SYNCOPE AND COLLAPSE: Primary | ICD-10-CM

## 2025-08-18 DIAGNOSIS — D64.9 ANEMIA, UNSPECIFIED TYPE: ICD-10-CM

## 2025-08-18 LAB
ALBUMIN SERPL-MCNC: 2 G/DL (ref 3.2–4.8)
ALBUMIN/GLOB SERPL: 0.7 (ref 1–2)
ALP LIVER SERPL-CCNC: 87 U/L (ref 37–98)
ALT SERPL-CCNC: 17 U/L (ref 10–49)
ANION GAP SERPL CALC-SCNC: 7 MMOL/L (ref 0–18)
AST SERPL-CCNC: 26 U/L (ref ?–34)
B-HCG UR QL: NEGATIVE
BASOPHILS # BLD AUTO: 0.04 X10(3) UL (ref 0–0.2)
BASOPHILS NFR BLD AUTO: 0.5 %
BILIRUB SERPL-MCNC: <0.2 MG/DL (ref 0.3–1.2)
BUN BLD-MCNC: 17 MG/DL (ref 9–23)
CALCIUM BLD-MCNC: 7.6 MG/DL (ref 8.7–10.6)
CHLORIDE SERPL-SCNC: 107 MMOL/L (ref 98–112)
CO2 SERPL-SCNC: 29 MMOL/L (ref 21–32)
CREAT BLD-MCNC: 0.64 MG/DL (ref 0.55–1.02)
EGFRCR SERPLBLD CKD-EPI 2021: 123 ML/MIN/1.73M2 (ref 60–?)
EOSINOPHIL # BLD AUTO: 0.19 X10(3) UL (ref 0–0.7)
EOSINOPHIL NFR BLD AUTO: 2.5 %
ERYTHROCYTE [DISTWIDTH] IN BLOOD BY AUTOMATED COUNT: 14.5 %
GLOBULIN PLAS-MCNC: 2.7 G/DL (ref 2–3.5)
GLUCOSE BLD-MCNC: 98 MG/DL (ref 70–99)
HCT VFR BLD AUTO: 27.9 % (ref 35–48)
HGB BLD-MCNC: 9.1 G/DL (ref 12–16)
IMM GRANULOCYTES # BLD AUTO: 0.02 X10(3) UL (ref 0–1)
IMM GRANULOCYTES NFR BLD: 0.3 %
LYMPHOCYTES # BLD AUTO: 3.02 X10(3) UL (ref 1–4)
LYMPHOCYTES NFR BLD AUTO: 39.9 %
MCH RBC QN AUTO: 27.3 PG (ref 26–34)
MCHC RBC AUTO-ENTMCNC: 32.6 G/DL (ref 31–37)
MCV RBC AUTO: 83.8 FL (ref 80–100)
MONOCYTES # BLD AUTO: 0.27 X10(3) UL (ref 0.1–1)
MONOCYTES NFR BLD AUTO: 3.6 %
NEUTROPHILS # BLD AUTO: 4.02 X10 (3) UL (ref 1.5–7.7)
NEUTROPHILS # BLD AUTO: 4.02 X10(3) UL (ref 1.5–7.7)
NEUTROPHILS NFR BLD AUTO: 53.2 %
NT-PROBNP SERPL-MCNC: 248 PG/ML (ref ?–125)
OSMOLALITY SERPL CALC.SUM OF ELEC: 298 MOSM/KG (ref 275–295)
PLATELET # BLD AUTO: 474 10(3)UL (ref 150–450)
POTASSIUM SERPL-SCNC: 3.6 MMOL/L (ref 3.5–5.1)
PROT SERPL-MCNC: 4.7 G/DL (ref 5.7–8.2)
RBC # BLD AUTO: 3.33 X10(6)UL (ref 3.8–5.3)
SODIUM SERPL-SCNC: 143 MMOL/L (ref 136–145)
TROPONIN I SERPL HS-MCNC: 4 NG/L (ref ?–34)
WBC # BLD AUTO: 7.6 X10(3) UL (ref 4–11)

## 2025-08-18 PROCEDURE — 93970 EXTREMITY STUDY: CPT | Performed by: EMERGENCY MEDICINE

## 2025-08-18 PROCEDURE — 74177 CT ABD & PELVIS W/CONTRAST: CPT | Performed by: EMERGENCY MEDICINE

## 2025-08-18 PROCEDURE — 71045 X-RAY EXAM CHEST 1 VIEW: CPT | Performed by: EMERGENCY MEDICINE

## 2025-08-18 RX ORDER — KETOROLAC TROMETHAMINE 15 MG/ML
15 INJECTION, SOLUTION INTRAMUSCULAR; INTRAVENOUS ONCE
Status: COMPLETED | OUTPATIENT
Start: 2025-08-18 | End: 2025-08-18

## 2025-08-19 ENCOUNTER — APPOINTMENT (OUTPATIENT)
Dept: CV DIAGNOSTICS | Facility: HOSPITAL | Age: 30
End: 2025-08-19
Attending: STUDENT IN AN ORGANIZED HEALTH CARE EDUCATION/TRAINING PROGRAM

## 2025-08-19 PROBLEM — D64.9 ANEMIA, UNSPECIFIED TYPE: Status: ACTIVE | Noted: 2025-08-19

## 2025-08-19 PROBLEM — R55 SYNCOPE AND COLLAPSE: Status: ACTIVE | Noted: 2025-08-19

## 2025-08-19 PROBLEM — N04.9 NEPHROTIC SYNDROME: Status: ACTIVE | Noted: 2025-08-19

## 2025-08-19 PROBLEM — R60.0 LOWER EXTREMITY EDEMA: Status: ACTIVE | Noted: 2025-08-19

## 2025-08-19 PROBLEM — K35.30 ACUTE APPENDICITIS WITH LOCALIZED PERITONITIS, WITHOUT PERFORATION, ABSCESS, OR GANGRENE: Status: ACTIVE | Noted: 2025-08-19

## 2025-08-19 PROBLEM — R80.9 NEPHROTIC RANGE PROTEINURIA: Status: ACTIVE | Noted: 2025-08-19

## 2025-08-19 LAB
ATRIAL RATE: 77 BPM
BILIRUB UR QL STRIP.AUTO: NEGATIVE
C3 SERPL-MCNC: 80.5 MG/DL (ref 82–160)
C4 SERPL-MCNC: 15.4 MG/DL (ref 12–36)
CLARITY UR REFRACT.AUTO: CLEAR
COLOR UR AUTO: YELLOW
CREAT UR-SCNC: 144 MG/DL
CREAT UR-SCNC: 187.4 MG/DL
GLUCOSE UR STRIP.AUTO-MCNC: NORMAL MG/DL
HBV SURFACE AB SER QL: NONREACTIVE
HBV SURFACE AB SERPL IA-ACNC: <3.1 MIU/ML
HBV SURFACE AG SER-ACNC: <0.1
HBV SURFACE AG SERPL QL IA: NONREACTIVE
HCV AB SERPL QL IA: NONREACTIVE
KETONES UR STRIP.AUTO-MCNC: NEGATIVE MG/DL
LEUKOCYTE ESTERASE UR QL STRIP.AUTO: NEGATIVE
MICROALBUMIN UR-MCNC: >380 MG/DL
NITRITE UR QL STRIP.AUTO: NEGATIVE
P AXIS: 42 DEGREES
P-R INTERVAL: 166 MS
PH UR STRIP.AUTO: 6.5 (ref 5–8)
PROT UR STRIP.AUTO-MCNC: 600 MG/DL
PROT UR-MCNC: 872.4 MG/DL (ref ?–14)
PROT/CREAT UR-RTO: 6.06
Q-T INTERVAL: 380 MS
QRS DURATION: 82 MS
QTC CALCULATION (BEZET): 430 MS
R AXIS: 23 DEGREES
RBC #/AREA URNS AUTO: >10 /HPF
SP GR UR STRIP.AUTO: >1.03 (ref 1–1.03)
T AXIS: 15 DEGREES
TSI SER-ACNC: 3.7 UIU/ML (ref 0.55–4.78)
UROBILINOGEN UR STRIP.AUTO-MCNC: NORMAL MG/DL
VENTRICULAR RATE: 77 BPM

## 2025-08-19 PROCEDURE — 99223 1ST HOSP IP/OBS HIGH 75: CPT | Performed by: INTERNAL MEDICINE

## 2025-08-19 PROCEDURE — 99223 1ST HOSP IP/OBS HIGH 75: CPT | Performed by: STUDENT IN AN ORGANIZED HEALTH CARE EDUCATION/TRAINING PROGRAM

## 2025-08-19 PROCEDURE — 93306 TTE W/DOPPLER COMPLETE: CPT | Performed by: STUDENT IN AN ORGANIZED HEALTH CARE EDUCATION/TRAINING PROGRAM

## 2025-08-19 RX ORDER — UBIDECARENONE 75 MG
100 CAPSULE ORAL DAILY
Status: DISCONTINUED | OUTPATIENT
Start: 2025-08-19 | End: 2025-08-20

## 2025-08-19 RX ORDER — ECHINACEA PURPUREA EXTRACT 125 MG
1 TABLET ORAL
Status: DISCONTINUED | OUTPATIENT
Start: 2025-08-19 | End: 2025-08-20

## 2025-08-19 RX ORDER — FOLIC ACID 1 MG/1
1 TABLET ORAL DAILY
Status: DISCONTINUED | OUTPATIENT
Start: 2025-08-19 | End: 2025-08-20

## 2025-08-19 RX ORDER — TOPIRAMATE 25 MG/1
25 TABLET, FILM COATED ORAL 2 TIMES DAILY
Status: DISCONTINUED | OUTPATIENT
Start: 2025-08-19 | End: 2025-08-20

## 2025-08-19 RX ORDER — PROCHLORPERAZINE EDISYLATE 5 MG/ML
5 INJECTION INTRAMUSCULAR; INTRAVENOUS EVERY 8 HOURS PRN
Status: DISCONTINUED | OUTPATIENT
Start: 2025-08-19 | End: 2025-08-20

## 2025-08-19 RX ORDER — ONDANSETRON 2 MG/ML
4 INJECTION INTRAMUSCULAR; INTRAVENOUS EVERY 6 HOURS PRN
Status: DISCONTINUED | OUTPATIENT
Start: 2025-08-19 | End: 2025-08-20

## 2025-08-19 RX ORDER — BISACODYL 10 MG
10 SUPPOSITORY, RECTAL RECTAL
Status: DISCONTINUED | OUTPATIENT
Start: 2025-08-19 | End: 2025-08-20

## 2025-08-19 RX ORDER — METRONIDAZOLE 500 MG/100ML
500 INJECTION, SOLUTION INTRAVENOUS EVERY 12 HOURS
Status: DISCONTINUED | OUTPATIENT
Start: 2025-08-19 | End: 2025-08-20

## 2025-08-19 RX ORDER — BENZONATATE 100 MG/1
200 CAPSULE ORAL 3 TIMES DAILY PRN
Status: DISCONTINUED | OUTPATIENT
Start: 2025-08-19 | End: 2025-08-20

## 2025-08-19 RX ORDER — KETOROLAC TROMETHAMINE 15 MG/ML
15 INJECTION, SOLUTION INTRAMUSCULAR; INTRAVENOUS EVERY 6 HOURS PRN
Status: DISCONTINUED | OUTPATIENT
Start: 2025-08-19 | End: 2025-08-20

## 2025-08-19 RX ORDER — SODIUM PHOSPHATE, DIBASIC AND SODIUM PHOSPHATE, MONOBASIC 7; 19 G/230ML; G/230ML
1 ENEMA RECTAL ONCE AS NEEDED
Status: DISCONTINUED | OUTPATIENT
Start: 2025-08-19 | End: 2025-08-20

## 2025-08-19 RX ORDER — ACETAMINOPHEN 500 MG
500 TABLET ORAL EVERY 4 HOURS PRN
Status: DISCONTINUED | OUTPATIENT
Start: 2025-08-19 | End: 2025-08-20

## 2025-08-19 RX ORDER — FUROSEMIDE 10 MG/ML
40 INJECTION INTRAMUSCULAR; INTRAVENOUS ONCE
Status: COMPLETED | OUTPATIENT
Start: 2025-08-19 | End: 2025-08-19

## 2025-08-19 RX ORDER — SENNOSIDES 8.6 MG
17.2 TABLET ORAL NIGHTLY PRN
Status: DISCONTINUED | OUTPATIENT
Start: 2025-08-19 | End: 2025-08-20

## 2025-08-19 RX ORDER — FUROSEMIDE 10 MG/ML
40 INJECTION INTRAMUSCULAR; INTRAVENOUS
Status: DISCONTINUED | OUTPATIENT
Start: 2025-08-19 | End: 2025-08-20

## 2025-08-19 RX ORDER — CYCLOBENZAPRINE HCL 10 MG
10 TABLET ORAL NIGHTLY PRN
Status: DISCONTINUED | OUTPATIENT
Start: 2025-08-19 | End: 2025-08-20

## 2025-08-19 RX ORDER — ESCITALOPRAM OXALATE 10 MG/1
10 TABLET ORAL EVERY MORNING
Status: DISCONTINUED | OUTPATIENT
Start: 2025-08-19 | End: 2025-08-20

## 2025-08-20 ENCOUNTER — APPOINTMENT (OUTPATIENT)
Dept: CT IMAGING | Facility: HOSPITAL | Age: 30
End: 2025-08-20
Attending: INTERNAL MEDICINE

## 2025-08-20 ENCOUNTER — APPOINTMENT (OUTPATIENT)
Dept: ULTRASOUND IMAGING | Facility: HOSPITAL | Age: 30
End: 2025-08-20
Attending: HOSPITALIST

## 2025-08-20 VITALS
HEIGHT: 67 IN | DIASTOLIC BLOOD PRESSURE: 74 MMHG | OXYGEN SATURATION: 98 % | HEART RATE: 62 BPM | RESPIRATION RATE: 18 BRPM | WEIGHT: 293 LBS | TEMPERATURE: 98 F | BODY MASS INDEX: 45.99 KG/M2 | SYSTOLIC BLOOD PRESSURE: 134 MMHG

## 2025-08-20 DIAGNOSIS — R73.03 PREDIABETES: ICD-10-CM

## 2025-08-20 DIAGNOSIS — G47.33 OSA (OBSTRUCTIVE SLEEP APNEA): ICD-10-CM

## 2025-08-20 DIAGNOSIS — Z51.81 ENCOUNTER FOR THERAPEUTIC DRUG MONITORING: ICD-10-CM

## 2025-08-20 DIAGNOSIS — E66.813 CLASS 3 SEVERE OBESITY WITH SERIOUS COMORBIDITY AND BODY MASS INDEX (BMI) OF 45.0 TO 49.9 IN ADULT, UNSPECIFIED OBESITY TYPE: ICD-10-CM

## 2025-08-20 LAB
ALBUMIN SERPL-MCNC: 2 G/DL (ref 3.2–4.8)
ALBUMIN/GLOB SERPL: 0.8 (ref 1–2)
ALP LIVER SERPL-CCNC: 77 U/L (ref 37–98)
ALT SERPL-CCNC: 13 U/L (ref 10–49)
ANION GAP SERPL CALC-SCNC: 9 MMOL/L (ref 0–18)
AST SERPL-CCNC: 22 U/L (ref ?–34)
BASOPHILS # BLD AUTO: 0.02 X10(3) UL (ref 0–0.2)
BASOPHILS NFR BLD AUTO: 0.3 %
BILIRUB SERPL-MCNC: 0.2 MG/DL (ref 0.3–1.2)
BUN BLD-MCNC: 10 MG/DL (ref 9–23)
CALCIUM BLD-MCNC: 7.7 MG/DL (ref 8.7–10.6)
CHLORIDE SERPL-SCNC: 107 MMOL/L (ref 98–112)
CO2 SERPL-SCNC: 27 MMOL/L (ref 21–32)
CREAT BLD-MCNC: 0.57 MG/DL (ref 0.55–1.02)
DSDNA IGG SERPL IA-ACNC: 1.7 IU/ML (ref ?–10)
EGFRCR SERPLBLD CKD-EPI 2021: 126 ML/MIN/1.73M2 (ref 60–?)
ENA AB SER QL IA: >55 UG/L (ref ?–0.7)
ENA AB SER QL IA: POSITIVE
EOSINOPHIL # BLD AUTO: 0.13 X10(3) UL (ref 0–0.7)
EOSINOPHIL NFR BLD AUTO: 2.2 %
ERYTHROCYTE [DISTWIDTH] IN BLOOD BY AUTOMATED COUNT: 14 %
GLOBULIN PLAS-MCNC: 2.5 G/DL (ref 2–3.5)
GLUCOSE BLD-MCNC: 88 MG/DL (ref 70–99)
HCT VFR BLD AUTO: 27.9 % (ref 35–48)
HGB BLD-MCNC: 9.1 G/DL (ref 12–16)
IMM GRANULOCYTES # BLD AUTO: 0.01 X10(3) UL (ref 0–1)
IMM GRANULOCYTES NFR BLD: 0.2 %
INR BLD: 1.1 (ref 0.8–1.2)
LYMPHOCYTES # BLD AUTO: 1.82 X10(3) UL (ref 1–4)
LYMPHOCYTES NFR BLD AUTO: 30.8 %
MCH RBC QN AUTO: 27 PG (ref 26–34)
MCHC RBC AUTO-ENTMCNC: 32.6 G/DL (ref 31–37)
MCV RBC AUTO: 82.8 FL (ref 80–100)
MONOCYTES # BLD AUTO: 0.23 X10(3) UL (ref 0.1–1)
MONOCYTES NFR BLD AUTO: 3.9 %
NEUTROPHILS # BLD AUTO: 3.7 X10 (3) UL (ref 1.5–7.7)
NEUTROPHILS # BLD AUTO: 3.7 X10(3) UL (ref 1.5–7.7)
NEUTROPHILS NFR BLD AUTO: 62.6 %
OSMOLALITY SERPL CALC.SUM OF ELEC: 294 MOSM/KG (ref 275–295)
PLATELET # BLD AUTO: 417 10(3)UL (ref 150–450)
POTASSIUM SERPL-SCNC: 3.6 MMOL/L (ref 3.5–5.1)
PROT SERPL-MCNC: 4.5 G/DL (ref 5.7–8.2)
PROTHROMBIN TIME: 13.9 SECONDS (ref 11.6–14.8)
RBC # BLD AUTO: 3.37 X10(6)UL (ref 3.8–5.3)
SODIUM SERPL-SCNC: 143 MMOL/L (ref 136–145)
WBC # BLD AUTO: 5.9 X10(3) UL (ref 4–11)

## 2025-08-20 PROCEDURE — 77012 CT SCAN FOR NEEDLE BIOPSY: CPT | Performed by: INTERNAL MEDICINE

## 2025-08-20 PROCEDURE — 76942 ECHO GUIDE FOR BIOPSY: CPT | Performed by: HOSPITALIST

## 2025-08-20 PROCEDURE — 50200 RENAL BIOPSY PERQ: CPT | Performed by: INTERNAL MEDICINE

## 2025-08-20 PROCEDURE — 99233 SBSQ HOSP IP/OBS HIGH 50: CPT | Performed by: INTERNAL MEDICINE

## 2025-08-20 PROCEDURE — 38505 NEEDLE BIOPSY LYMPH NODES: CPT | Performed by: HOSPITALIST

## 2025-08-20 PROCEDURE — 99239 HOSP IP/OBS DSCHRG MGMT >30: CPT | Performed by: HOSPITALIST

## 2025-08-20 PROCEDURE — 99152 MOD SED SAME PHYS/QHP 5/>YRS: CPT | Performed by: INTERNAL MEDICINE

## 2025-08-20 RX ORDER — SODIUM CHLORIDE 9 MG/ML
INJECTION, SOLUTION INTRAVENOUS CONTINUOUS
Status: DISCONTINUED | OUTPATIENT
Start: 2025-08-20 | End: 2025-08-20

## 2025-08-20 RX ORDER — NALOXONE HYDROCHLORIDE 0.4 MG/ML
0.08 INJECTION, SOLUTION INTRAMUSCULAR; INTRAVENOUS; SUBCUTANEOUS AS NEEDED
Status: DISCONTINUED | OUTPATIENT
Start: 2025-08-20 | End: 2025-08-20

## 2025-08-20 RX ORDER — MIDAZOLAM HYDROCHLORIDE 1 MG/ML
INJECTION INTRAMUSCULAR; INTRAVENOUS
Status: COMPLETED
Start: 2025-08-20 | End: 2025-08-20

## 2025-08-20 RX ORDER — FLUMAZENIL 0.1 MG/ML
0.2 INJECTION INTRAVENOUS AS NEEDED
Status: DISCONTINUED | OUTPATIENT
Start: 2025-08-20 | End: 2025-08-20

## 2025-08-20 RX ORDER — MIDAZOLAM HYDROCHLORIDE 1 MG/ML
1 INJECTION INTRAMUSCULAR; INTRAVENOUS EVERY 5 MIN PRN
Status: DISCONTINUED | OUTPATIENT
Start: 2025-08-21 | End: 2025-08-20

## 2025-08-21 ENCOUNTER — PATIENT OUTREACH (OUTPATIENT)
Age: 30
End: 2025-08-21

## 2025-08-21 LAB
CENTROMERE IGG SER-ACNC: 0.5 U/ML (ref ?–7)
ENA JO1 AB SER IA-ACNC: <0.3 U/ML (ref ?–7)
ENA RNP IGG SER IA-ACNC: 27.1 U/ML (ref ?–7)
ENA SCL70 IGG SER IA-ACNC: 0.8 U/ML (ref ?–7)
ENA SM IGG SER IA-ACNC: 3.8 U/ML (ref ?–7)
ENA SS-A IGG SER IA-ACNC: 1.3 U/ML (ref ?–7)
ENA SS-B IGG SER IA-ACNC: <0.4 U/ML (ref ?–7)
U1 SNRNP IGG SER IA-ACNC: >240 U/ML (ref ?–5)

## 2025-08-22 ENCOUNTER — PATIENT OUTREACH (OUTPATIENT)
Dept: CASE MANAGEMENT | Age: 30
End: 2025-08-22

## 2025-08-22 LAB — ANTI-PLA2R: <1.8 RU/ML

## 2025-08-23 RX ORDER — TIRZEPATIDE 10 MG/.5ML
10 INJECTION, SOLUTION SUBCUTANEOUS WEEKLY
Qty: 2 ML | Refills: 0 | Status: SHIPPED | OUTPATIENT
Start: 2025-08-23

## 2025-08-25 ENCOUNTER — MED REC SCAN ONLY (OUTPATIENT)
Dept: NEPHROLOGY | Facility: CLINIC | Age: 30
End: 2025-08-25

## 2025-08-26 ENCOUNTER — HOSPITAL ENCOUNTER (EMERGENCY)
Facility: HOSPITAL | Age: 30
Discharge: HOME | End: 2025-08-26
Attending: EMERGENCY MEDICINE

## 2025-08-26 ENCOUNTER — OFFICE VISIT (OUTPATIENT)
Dept: FAMILY MEDICINE CLINIC | Facility: CLINIC | Age: 30
End: 2025-08-26

## 2025-08-26 ENCOUNTER — APPOINTMENT (OUTPATIENT)
Dept: CT IMAGING | Facility: HOSPITAL | Age: 30
End: 2025-08-26
Attending: EMERGENCY MEDICINE

## 2025-08-26 ENCOUNTER — OFFICE VISIT (OUTPATIENT)
Dept: NEPHROLOGY | Facility: CLINIC | Age: 30
End: 2025-08-26

## 2025-08-26 VITALS
HEIGHT: 67 IN | WEIGHT: 293 LBS | OXYGEN SATURATION: 100 % | HEART RATE: 108 BPM | TEMPERATURE: 97 F | RESPIRATION RATE: 20 BRPM | DIASTOLIC BLOOD PRESSURE: 70 MMHG | BODY MASS INDEX: 45.99 KG/M2 | SYSTOLIC BLOOD PRESSURE: 102 MMHG

## 2025-08-26 VITALS
TEMPERATURE: 99 F | HEART RATE: 92 BPM | OXYGEN SATURATION: 100 % | HEIGHT: 67 IN | SYSTOLIC BLOOD PRESSURE: 148 MMHG | RESPIRATION RATE: 14 BRPM | WEIGHT: 293 LBS | DIASTOLIC BLOOD PRESSURE: 73 MMHG | BODY MASS INDEX: 45.99 KG/M2

## 2025-08-26 VITALS — SYSTOLIC BLOOD PRESSURE: 130 MMHG | DIASTOLIC BLOOD PRESSURE: 72 MMHG | BODY MASS INDEX: 53 KG/M2 | WEIGHT: 293 LBS

## 2025-08-26 DIAGNOSIS — N04.9 NEPHROTIC SYNDROME: Primary | ICD-10-CM

## 2025-08-26 DIAGNOSIS — R59.1 LYMPHADENOPATHY: ICD-10-CM

## 2025-08-26 DIAGNOSIS — Z09 HOSPITAL DISCHARGE FOLLOW-UP: ICD-10-CM

## 2025-08-26 DIAGNOSIS — R10.9 ABDOMINAL PAIN OF UNKNOWN ETIOLOGY: Primary | ICD-10-CM

## 2025-08-26 DIAGNOSIS — N02.2 MEMBRANOUS NEPHROPATHY DETERMINED BY BIOPSY: ICD-10-CM

## 2025-08-26 DIAGNOSIS — R80.9 NEPHROTIC RANGE PROTEINURIA: ICD-10-CM

## 2025-08-26 DIAGNOSIS — K35.890 OTHER ACUTE APPENDICITIS: Primary | ICD-10-CM

## 2025-08-26 LAB
ALBUMIN SERPL-MCNC: 2.1 G/DL (ref 3.2–4.8)
ALBUMIN/GLOB SERPL: 0.8 (ref 1–2)
ALP LIVER SERPL-CCNC: 77 U/L (ref 37–98)
ALT SERPL-CCNC: 14 U/L (ref 10–49)
ANION GAP SERPL CALC-SCNC: 8 MMOL/L (ref 0–18)
AST SERPL-CCNC: 22 U/L (ref ?–34)
BASOPHILS # BLD AUTO: 0.01 X10(3) UL (ref 0–0.2)
BASOPHILS NFR BLD AUTO: 0.2 %
BILIRUB SERPL-MCNC: 0.2 MG/DL (ref 0.3–1.2)
BUN BLD-MCNC: 14 MG/DL (ref 9–23)
CALCIUM BLD-MCNC: 7.9 MG/DL (ref 8.7–10.6)
CD10 CELLS NFR SPEC: <1 %
CD10/CD19: <1 %
CD19 CELLS NFR SPEC: 16 %
CD19+/CD200+: 2 %
CD2 CELLS NFR SPEC: 83 %
CD20 CELLS NFR SPEC: 16 %
CD200 CELLS: 9 %
CD3 CELLS NFR SPEC: 83 %
CD3+/TCRGD+: 2 %
CD3+CD4+ CELLS NFR SPEC: 51 %
CD3+CD4+ CELLS/CD3+CD8+ CLL SPEC: 1.7
CD3+CD8+ CELLS NFR SPEC: 30 %
CD3-/CD56+: 1 %
CD34 CELLS NFR SPEC: <1 %
CD38 CELLS NFR SPEC: <1 %
CD38+/CD19+: <1 %
CD45 CELLS NFR SPEC: 100 %
CD5 CELLS NFR SPEC: 83 %
CD5/CD19 CELLS: 1 %
CD7 CELLS NFR SPEC: 78 %
CELL SURF KAPPA/LAMBDA RATIO: 1.8
CELL SURF LAMBDA LIGHT CHAIN: 6 %
CELL SURFACE KAPPA LIGHT CHAIN: 11 %
CHLORIDE SERPL-SCNC: 110 MMOL/L (ref 98–112)
CO2 SERPL-SCNC: 26 MMOL/L (ref 21–32)
CREAT BLD-MCNC: 0.72 MG/DL (ref 0.55–1.02)
EGFRCR SERPLBLD CKD-EPI 2021: 116 ML/MIN/1.73M2 (ref 60–?)
EOSINOPHIL # BLD AUTO: 0.06 X10(3) UL (ref 0–0.7)
EOSINOPHIL NFR BLD AUTO: 1 %
ERYTHROCYTE [DISTWIDTH] IN BLOOD BY AUTOMATED COUNT: 14.6 %
GLOBULIN PLAS-MCNC: 2.8 G/DL (ref 2–3.5)
GLUCOSE BLD-MCNC: 93 MG/DL (ref 70–99)
HCT VFR BLD AUTO: 27 % (ref 35–48)
HGB BLD-MCNC: 8.7 G/DL (ref 12–16)
IMM GRANULOCYTES # BLD AUTO: 0.02 X10(3) UL (ref 0–1)
IMM GRANULOCYTES NFR BLD: 0.3 %
LIPASE SERPL-CCNC: 21 U/L (ref 12–53)
LYMPHOCYTES # BLD AUTO: 2.05 X10(3) UL (ref 1–4)
LYMPHOCYTES NFR BLD AUTO: 33.8 %
MCH RBC QN AUTO: 26.9 PG (ref 26–34)
MCHC RBC AUTO-ENTMCNC: 32.2 G/DL (ref 31–37)
MCV RBC AUTO: 83.6 FL (ref 80–100)
MONOCYTES # BLD AUTO: 0.22 X10(3) UL (ref 0.1–1)
MONOCYTES NFR BLD AUTO: 3.6 %
NEUTROPHILS # BLD AUTO: 3.71 X10 (3) UL (ref 1.5–7.7)
NEUTROPHILS # BLD AUTO: 3.71 X10(3) UL (ref 1.5–7.7)
NEUTROPHILS NFR BLD AUTO: 61.1 %
OSMOLALITY SERPL CALC.SUM OF ELEC: 298 MOSM/KG (ref 275–295)
PLATELET # BLD AUTO: 322 10(3)UL (ref 150–450)
POTASSIUM SERPL-SCNC: 4.3 MMOL/L (ref 3.5–5.1)
PROT SERPL-MCNC: 4.9 G/DL (ref 5.7–8.2)
RBC # BLD AUTO: 3.23 X10(6)UL (ref 3.8–5.3)
SODIUM SERPL-SCNC: 144 MMOL/L (ref 136–145)
TCR G-D CELLS NFR SPEC: 2 %
WBC # BLD AUTO: 6.1 X10(3) UL (ref 4–11)

## 2025-08-26 PROCEDURE — 80053 COMPREHEN METABOLIC PANEL: CPT

## 2025-08-26 PROCEDURE — 99284 EMERGENCY DEPT VISIT MOD MDM: CPT

## 2025-08-26 PROCEDURE — 85025 COMPLETE CBC W/AUTO DIFF WBC: CPT | Performed by: EMERGENCY MEDICINE

## 2025-08-26 PROCEDURE — 99496 TRANSJ CARE MGMT HIGH F2F 7D: CPT | Performed by: FAMILY MEDICINE

## 2025-08-26 PROCEDURE — 83690 ASSAY OF LIPASE: CPT | Performed by: EMERGENCY MEDICINE

## 2025-08-26 PROCEDURE — 3078F DIAST BP <80 MM HG: CPT | Performed by: INTERNAL MEDICINE

## 2025-08-26 PROCEDURE — 74177 CT ABD & PELVIS W/CONTRAST: CPT | Performed by: EMERGENCY MEDICINE

## 2025-08-26 PROCEDURE — 80053 COMPREHEN METABOLIC PANEL: CPT | Performed by: EMERGENCY MEDICINE

## 2025-08-26 PROCEDURE — 99285 EMERGENCY DEPT VISIT HI MDM: CPT

## 2025-08-26 PROCEDURE — 99214 OFFICE O/P EST MOD 30 MIN: CPT | Performed by: INTERNAL MEDICINE

## 2025-08-26 PROCEDURE — 96374 THER/PROPH/DIAG INJ IV PUSH: CPT

## 2025-08-26 PROCEDURE — 85025 COMPLETE CBC W/AUTO DIFF WBC: CPT

## 2025-08-26 PROCEDURE — 3008F BODY MASS INDEX DOCD: CPT | Performed by: FAMILY MEDICINE

## 2025-08-26 PROCEDURE — 83690 ASSAY OF LIPASE: CPT

## 2025-08-26 PROCEDURE — 3075F SYST BP GE 130 - 139MM HG: CPT | Performed by: INTERNAL MEDICINE

## 2025-08-26 PROCEDURE — 3074F SYST BP LT 130 MM HG: CPT | Performed by: FAMILY MEDICINE

## 2025-08-26 PROCEDURE — 3078F DIAST BP <80 MM HG: CPT | Performed by: FAMILY MEDICINE

## 2025-08-26 RX ORDER — LISINOPRIL 5 MG/1
5 TABLET ORAL DAILY
Qty: 30 TABLET | Refills: 2 | Status: SHIPPED | OUTPATIENT
Start: 2025-08-26

## 2025-08-26 RX ORDER — DICYCLOMINE HCL 20 MG
20 TABLET ORAL 4 TIMES DAILY PRN
Qty: 30 TABLET | Refills: 0 | Status: SHIPPED | OUTPATIENT
Start: 2025-08-26 | End: 2025-09-25

## 2025-08-26 RX ORDER — ESCITALOPRAM OXALATE 10 MG/1
10 TABLET ORAL DAILY
COMMUNITY
Start: 2025-07-01

## 2025-08-26 RX ORDER — FUROSEMIDE 40 MG/1
40 TABLET ORAL DAILY
Qty: 30 TABLET | Refills: 0 | Status: SHIPPED | OUTPATIENT
Start: 2025-08-26 | End: 2025-09-25

## 2025-08-26 RX ORDER — POTASSIUM CHLORIDE 750 MG/1
10 TABLET, EXTENDED RELEASE ORAL DAILY
Qty: 30 TABLET | Refills: 0 | Status: SHIPPED | OUTPATIENT
Start: 2025-08-26

## 2025-08-26 RX ORDER — MORPHINE SULFATE 4 MG/ML
4 INJECTION, SOLUTION INTRAMUSCULAR; INTRAVENOUS ONCE
Status: COMPLETED | OUTPATIENT
Start: 2025-08-26 | End: 2025-08-26

## 2025-08-27 ENCOUNTER — PATIENT OUTREACH (OUTPATIENT)
Dept: CASE MANAGEMENT | Age: 30
End: 2025-08-27

## 2025-08-28 RX ORDER — POTASSIUM CHLORIDE 750 MG/1
10 TABLET, EXTENDED RELEASE ORAL DAILY
Qty: 90 TABLET | Refills: 0 | OUTPATIENT
Start: 2025-08-28

## 2025-08-29 ENCOUNTER — TELEPHONE (OUTPATIENT)
Dept: NEPHROLOGY | Facility: CLINIC | Age: 30
End: 2025-08-29

## 2025-08-29 DIAGNOSIS — N02.2 MEMBRANOUS NEPHROPATHY DETERMINED BY BIOPSY: ICD-10-CM

## 2025-08-29 DIAGNOSIS — N04.9 NEPHROTIC SYNDROME: Primary | ICD-10-CM

## 2025-08-29 DIAGNOSIS — E88.09 HYPOALBUMINEMIA: ICD-10-CM

## 2025-08-29 DIAGNOSIS — R76.8 POSITIVE ANA (ANTINUCLEAR ANTIBODY): ICD-10-CM

## (undated) NOTE — ED AVS SNAPSHOT
THE [facility] Emergency Department in [address] [facility]    Phone:  [phone]    Fax:  [facility] Hallie Vasquez   MRN: [id number]    Department:  THE [facility] Emergency Department in [place]   Date of Visit IF THERE IS ANY CHANGE OR WORSENING OF YOUR CONDITION, CALL YOUR PRIMARY CARE PHYSICIAN AT ONCE OR RETURN IMMEDIATELY TO THE EMERGENCY DEPARTMENT.     If you have been prescribed any medication(s), please fill your prescription right away and begin taking t

## (undated) NOTE — ED AVS SNAPSHOT
THE [facility] Emergency Department in [address]    Phone:  [phone]    Fax:  [facility] Savanna Garland   MRN: [id number]    Department:  THE [facility] Emergency Department in [place]   Date of Visit To Check ER Wait Times:  TEXT 'ERwait' to 03795      Click [email or web address]      Or call [phone]    If you have any problems with your follow-up, please call our  at [phone]    Si usted tiene algun problema con hopson sequimiento, por f I have read and understand the instructions given to me by my caregivers. 24-Hour Pharmacies        Pharmacy Address Phone Number   [address] [facility] ([address]) [address] [place] [address].  [facility] & visit,  view other health information, and more. To sign up or find more information, go to [email or web address] and click on the Sign Up Now link in the [employer] box.      Enter your [employer] Activation Code exactly as it appears below along with yo

## (undated) NOTE — LETTER
Date: 7/25/2025    Patient Name: Pat Leiva          To Whom it may concern:    This letter has been written at the patient's request. The above patient was seen at [facility] for treatment of a medical condition.     She cannot go to any site visits/home inspections where there are cats due to allergy.         Sincerely,    Alexa Tucker, DO

## (undated) NOTE — LETTER
02/06/20        [address] [facility] [address] [facility] [address]      Dear Andre Ferguson,    [facility] that you have outstanding lab work and or testing that was ordered for you and has not yet been completed:      Nilson Parikh (to

## (undated) NOTE — LETTER
Date: 3/3/2025    Patient Name: Pat Leiva          To Whom it may concern:    This letter has been written at the patient's request. The above patient was seen at [facility] for treatment of a medical condition.      The patient may return to work on 3/3/25          Sincerely,    Alexa Tucker DO

## (undated) NOTE — LETTER
Date & Time: 11/24/2024, 10:42 PM  Patient: Pat Leiva  Encounter Provider(s):    David Taylor MD       To Whom It May Concern:    Pat Leiva was seen and treated in our department on 11/24/2024. She should not return to work until 11/28/24 .    If you have any questions or concerns, please do not hesitate to call.        _____________________________  Physician/APC Signature

## (undated) NOTE — LETTER
01/28/19        [address] [facility] [address]      Dear Bianca Aguila,    [address] [facility] records indicate that you have outstanding lab work and or testing that was ordered for you and has not yet been completed: Ultrasound - Please call [phone]

## (undated) NOTE — LETTER
Date & Time: 11/9/2024, 7:38 AM  Patient: Pat Leiva  Encounter Provider(s):    David Taylor MD       To Whom It May Concern:    Pat Leiva was seen and treated in our department on 11/9/2024. She can return to work on Wednesday 11/13/2024.    If you have any questions or concerns, please do not hesitate to call.        _____________________________  Physician/[facility] Signature

## (undated) NOTE — Clinical Note
Thank you for referring Pat to the [facility]. Consult was completed today via clinic.  I have referred for a nutrition consultation with our dietician.  I counseled on the importance of lifestyle intervention in adjunct with medication and started Zepbound for treatment with follow-up advised in about 4 months.

## (undated) NOTE — LETTER
11/25/19        [address] [facility] [address] [facility] [address]      Dear Angela Cheney,    [facility] records indicate that you have outstanding lab work and or testing that was ordered for you and has not yet been completed: Non Fasting Lab Work   To complet

## (undated) NOTE — LETTER
9/29/2017              Flor Leiva        [id number] [id number]         To Whom It May Concern,    I am Pat's rheumatologist.  She has rheumatoid arthritis, especially affecting her right wrist and hand.     She needs t
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
